# Patient Record
Sex: MALE | Race: WHITE | Employment: FULL TIME | URBAN - METROPOLITAN AREA
[De-identification: names, ages, dates, MRNs, and addresses within clinical notes are randomized per-mention and may not be internally consistent; named-entity substitution may affect disease eponyms.]

---

## 2017-07-07 ENCOUNTER — HOSPITAL ENCOUNTER (OUTPATIENT)
Age: 57
Setting detail: OBSERVATION
Discharge: HOME | End: 2017-07-08
Attending: EMERGENCY MEDICINE | Admitting: EMERGENCY MEDICINE
Payer: COMMERCIAL

## 2017-07-07 DIAGNOSIS — N20.0 RENAL STONE: Primary | ICD-10-CM

## 2017-07-07 LAB
-: NORMAL
ABSOLUTE EOS #: 0.2 K/UL (ref 0–0.4)
ABSOLUTE LYMPH #: 1 K/UL (ref 1–4.8)
ABSOLUTE MONO #: 1.2 K/UL (ref 0.1–1.2)
AMORPHOUS: NORMAL
ANION GAP SERPL CALCULATED.3IONS-SCNC: 12 MMOL/L (ref 9–17)
BACTERIA: NORMAL
BASOPHILS # BLD: 0 %
BASOPHILS ABSOLUTE: 0 K/UL (ref 0–0.2)
BILIRUBIN URINE: NEGATIVE
BUN BLDV-MCNC: 15 MG/DL (ref 6–20)
BUN/CREAT BLD: ABNORMAL (ref 9–20)
CALCIUM SERPL-MCNC: 9.4 MG/DL (ref 8.6–10.4)
CASTS UA: NORMAL /LPF (ref 0–8)
CHLORIDE BLD-SCNC: 98 MMOL/L (ref 98–107)
CO2: 25 MMOL/L (ref 20–31)
COLOR: YELLOW
COMMENT UA: ABNORMAL
CREAT SERPL-MCNC: 1.4 MG/DL (ref 0.7–1.2)
CRYSTALS, UA: NORMAL /HPF
DIFFERENTIAL TYPE: ABNORMAL
EOSINOPHILS RELATIVE PERCENT: 1 %
EPITHELIAL CELLS UA: NORMAL /HPF (ref 0–5)
GFR AFRICAN AMERICAN: >60 ML/MIN
GFR NON-AFRICAN AMERICAN: 52 ML/MIN
GFR SERPL CREATININE-BSD FRML MDRD: ABNORMAL ML/MIN/{1.73_M2}
GFR SERPL CREATININE-BSD FRML MDRD: ABNORMAL ML/MIN/{1.73_M2}
GLUCOSE BLD-MCNC: 110 MG/DL (ref 70–99)
GLUCOSE URINE: NEGATIVE
HCT VFR BLD CALC: 45.5 % (ref 41–53)
HEMOGLOBIN: 15.1 G/DL (ref 13.5–17.5)
KETONES, URINE: ABNORMAL
LEUKOCYTE ESTERASE, URINE: NEGATIVE
LYMPHOCYTES # BLD: 8 %
MCH RBC QN AUTO: 32.1 PG (ref 26–34)
MCHC RBC AUTO-ENTMCNC: 33.1 G/DL (ref 31–37)
MCV RBC AUTO: 96.8 FL (ref 80–100)
MONOCYTES # BLD: 10 %
MUCUS: NORMAL
NITRITE, URINE: NEGATIVE
OTHER OBSERVATIONS UA: NORMAL
PDW BLD-RTO: 14.1 % (ref 12.5–15.4)
PH UA: 5 (ref 5–8)
PLATELET # BLD: 188 K/UL (ref 140–450)
PLATELET ESTIMATE: ABNORMAL
PMV BLD AUTO: 8.6 FL (ref 6–12)
POTASSIUM SERPL-SCNC: 4.4 MMOL/L (ref 3.7–5.3)
PROTEIN UA: NEGATIVE
RBC # BLD: 4.7 M/UL (ref 4.5–5.9)
RBC # BLD: ABNORMAL 10*6/UL
RBC UA: NORMAL /HPF (ref 0–4)
RENAL EPITHELIAL, UA: NORMAL /HPF
SEG NEUTROPHILS: 81 %
SEGMENTED NEUTROPHILS ABSOLUTE COUNT: 9.9 K/UL (ref 1.8–7.7)
SODIUM BLD-SCNC: 135 MMOL/L (ref 135–144)
SPECIFIC GRAVITY UA: 1.02 (ref 1–1.03)
TRICHOMONAS: NORMAL
TURBIDITY: CLEAR
URINE HGB: ABNORMAL
UROBILINOGEN, URINE: NORMAL
WBC # BLD: 12.2 K/UL (ref 3.5–11)
WBC # BLD: ABNORMAL 10*3/UL
WBC UA: NORMAL /HPF (ref 0–5)
YEAST: NORMAL

## 2017-07-07 PROCEDURE — 81001 URINALYSIS AUTO W/SCOPE: CPT

## 2017-07-07 PROCEDURE — 96375 TX/PRO/DX INJ NEW DRUG ADDON: CPT

## 2017-07-07 PROCEDURE — 80048 BASIC METABOLIC PNL TOTAL CA: CPT

## 2017-07-07 PROCEDURE — 6360000002 HC RX W HCPCS: Performed by: EMERGENCY MEDICINE

## 2017-07-07 PROCEDURE — 2580000003 HC RX 258: Performed by: EMERGENCY MEDICINE

## 2017-07-07 PROCEDURE — 94640 AIRWAY INHALATION TREATMENT: CPT

## 2017-07-07 PROCEDURE — 85025 COMPLETE CBC W/AUTO DIFF WBC: CPT

## 2017-07-07 PROCEDURE — 96376 TX/PRO/DX INJ SAME DRUG ADON: CPT

## 2017-07-07 PROCEDURE — 96374 THER/PROPH/DIAG INJ IV PUSH: CPT

## 2017-07-07 PROCEDURE — 6360000002 HC RX W HCPCS: Performed by: STUDENT IN AN ORGANIZED HEALTH CARE EDUCATION/TRAINING PROGRAM

## 2017-07-07 PROCEDURE — G0378 HOSPITAL OBSERVATION PER HR: HCPCS

## 2017-07-07 PROCEDURE — 99284 EMERGENCY DEPT VISIT MOD MDM: CPT

## 2017-07-07 PROCEDURE — 87086 URINE CULTURE/COLONY COUNT: CPT

## 2017-07-07 PROCEDURE — 6370000000 HC RX 637 (ALT 250 FOR IP): Performed by: STUDENT IN AN ORGANIZED HEALTH CARE EDUCATION/TRAINING PROGRAM

## 2017-07-07 RX ORDER — 0.9 % SODIUM CHLORIDE 0.9 %
1000 INTRAVENOUS SOLUTION INTRAVENOUS ONCE
Status: COMPLETED | OUTPATIENT
Start: 2017-07-07 | End: 2017-07-07

## 2017-07-07 RX ORDER — ONDANSETRON 2 MG/ML
4 INJECTION INTRAMUSCULAR; INTRAVENOUS ONCE
Status: COMPLETED | OUTPATIENT
Start: 2017-07-07 | End: 2017-07-07

## 2017-07-07 RX ORDER — METOPROLOL SUCCINATE 100 MG/1
100 TABLET, EXTENDED RELEASE ORAL DAILY
Status: DISCONTINUED | OUTPATIENT
Start: 2017-07-07 | End: 2017-07-08 | Stop reason: HOSPADM

## 2017-07-07 RX ORDER — ONDANSETRON 2 MG/ML
4 INJECTION INTRAMUSCULAR; INTRAVENOUS EVERY 8 HOURS PRN
Status: DISCONTINUED | OUTPATIENT
Start: 2017-07-07 | End: 2017-07-08 | Stop reason: HOSPADM

## 2017-07-07 RX ORDER — PROMETHAZINE HYDROCHLORIDE 25 MG/ML
12.5 INJECTION, SOLUTION INTRAMUSCULAR; INTRAVENOUS EVERY 6 HOURS PRN
Status: DISCONTINUED | OUTPATIENT
Start: 2017-07-07 | End: 2017-07-08 | Stop reason: HOSPADM

## 2017-07-07 RX ORDER — ASPIRIN 81 MG/1
81 TABLET ORAL DAILY
Status: DISCONTINUED | OUTPATIENT
Start: 2017-07-07 | End: 2017-07-08 | Stop reason: HOSPADM

## 2017-07-07 RX ORDER — SODIUM CHLORIDE 9 MG/ML
INJECTION, SOLUTION INTRAVENOUS CONTINUOUS
Status: DISCONTINUED | OUTPATIENT
Start: 2017-07-07 | End: 2017-07-08 | Stop reason: HOSPADM

## 2017-07-07 RX ORDER — ASCORBIC ACID 1000 MG
1 TABLET ORAL DAILY
Status: DISCONTINUED | OUTPATIENT
Start: 2017-07-07 | End: 2017-07-07

## 2017-07-07 RX ORDER — WARFARIN SODIUM 1 MG/1
1 TABLET ORAL DAILY
Status: DISCONTINUED | OUTPATIENT
Start: 2017-07-07 | End: 2017-07-08 | Stop reason: HOSPADM

## 2017-07-07 RX ORDER — SODIUM CHLORIDE 0.9 % (FLUSH) 0.9 %
10 SYRINGE (ML) INJECTION PRN
Status: DISCONTINUED | OUTPATIENT
Start: 2017-07-07 | End: 2017-07-08 | Stop reason: HOSPADM

## 2017-07-07 RX ORDER — SODIUM CHLORIDE 0.9 % (FLUSH) 0.9 %
10 SYRINGE (ML) INJECTION EVERY 12 HOURS SCHEDULED
Status: DISCONTINUED | OUTPATIENT
Start: 2017-07-07 | End: 2017-07-08 | Stop reason: HOSPADM

## 2017-07-07 RX ORDER — MORPHINE SULFATE 4 MG/ML
4 INJECTION, SOLUTION INTRAMUSCULAR; INTRAVENOUS ONCE
Status: COMPLETED | OUTPATIENT
Start: 2017-07-07 | End: 2017-07-07

## 2017-07-07 RX ORDER — PANTOPRAZOLE SODIUM 40 MG/1
40 TABLET, DELAYED RELEASE ORAL
Status: DISCONTINUED | OUTPATIENT
Start: 2017-07-08 | End: 2017-07-08 | Stop reason: HOSPADM

## 2017-07-07 RX ORDER — SIMVASTATIN 40 MG
40 TABLET ORAL NIGHTLY
Status: DISCONTINUED | OUTPATIENT
Start: 2017-07-07 | End: 2017-07-08 | Stop reason: HOSPADM

## 2017-07-07 RX ADMIN — HYDROMORPHONE HYDROCHLORIDE 0.5 MG: 1 INJECTION, SOLUTION INTRAMUSCULAR; INTRAVENOUS; SUBCUTANEOUS at 17:58

## 2017-07-07 RX ADMIN — HYDROMORPHONE HYDROCHLORIDE 0.5 MG: 1 INJECTION, SOLUTION INTRAMUSCULAR; INTRAVENOUS; SUBCUTANEOUS at 22:03

## 2017-07-07 RX ADMIN — ONDANSETRON 4 MG: 2 INJECTION INTRAMUSCULAR; INTRAVENOUS at 15:05

## 2017-07-07 RX ADMIN — MORPHINE SULFATE 4 MG: 4 INJECTION, SOLUTION INTRAMUSCULAR; INTRAVENOUS at 15:05

## 2017-07-07 RX ADMIN — PROMETHAZINE HYDROCHLORIDE 12.5 MG: 25 INJECTION INTRAMUSCULAR; INTRAVENOUS at 17:58

## 2017-07-07 RX ADMIN — SODIUM CHLORIDE: 9 INJECTION, SOLUTION INTRAVENOUS at 18:05

## 2017-07-07 RX ADMIN — SODIUM CHLORIDE, PRESERVATIVE FREE 10 ML: 5 INJECTION INTRAVENOUS at 22:21

## 2017-07-07 RX ADMIN — SODIUM CHLORIDE 1000 ML: 9 INJECTION, SOLUTION INTRAVENOUS at 15:04

## 2017-07-07 RX ADMIN — BECLOMETHASONE DIPROPIONATE 2 PUFF: 80 AEROSOL, METERED RESPIRATORY (INHALATION) at 20:35

## 2017-07-07 RX ADMIN — HYDROMORPHONE HYDROCHLORIDE 1 MG: 1 INJECTION, SOLUTION INTRAMUSCULAR; INTRAVENOUS; SUBCUTANEOUS at 15:44

## 2017-07-07 ASSESSMENT — ENCOUNTER SYMPTOMS
SHORTNESS OF BREATH: 0
COLOR CHANGE: 0
CHEST TIGHTNESS: 0
DIARRHEA: 0
VOMITING: 1
ABDOMINAL PAIN: 0
NAUSEA: 1

## 2017-07-07 ASSESSMENT — PAIN DESCRIPTION - LOCATION: LOCATION: FLANK

## 2017-07-07 ASSESSMENT — PAIN DESCRIPTION - ORIENTATION: ORIENTATION: RIGHT;LEFT

## 2017-07-07 ASSESSMENT — PAIN SCALES - GENERAL
PAINLEVEL_OUTOF10: 3
PAINLEVEL_OUTOF10: 4
PAINLEVEL_OUTOF10: 3
PAINLEVEL_OUTOF10: 3
PAINLEVEL_OUTOF10: 10
PAINLEVEL_OUTOF10: 3

## 2017-07-07 ASSESSMENT — PAIN DESCRIPTION - DESCRIPTORS: DESCRIPTORS: DULL

## 2017-07-07 ASSESSMENT — PAIN DESCRIPTION - PAIN TYPE: TYPE: ACUTE PAIN

## 2017-07-08 ENCOUNTER — ANESTHESIA EVENT (OUTPATIENT)
Dept: OPERATING ROOM | Age: 57
End: 2017-07-08
Payer: COMMERCIAL

## 2017-07-08 ENCOUNTER — ANESTHESIA (OUTPATIENT)
Dept: OPERATING ROOM | Age: 57
End: 2017-07-08
Payer: COMMERCIAL

## 2017-07-08 ENCOUNTER — APPOINTMENT (OUTPATIENT)
Dept: GENERAL RADIOLOGY | Age: 57
End: 2017-07-08
Payer: COMMERCIAL

## 2017-07-08 VITALS
SYSTOLIC BLOOD PRESSURE: 101 MMHG | DIASTOLIC BLOOD PRESSURE: 63 MMHG | OXYGEN SATURATION: 94 % | RESPIRATION RATE: 14 BRPM | TEMPERATURE: 97.7 F

## 2017-07-08 VITALS
RESPIRATION RATE: 16 BRPM | HEIGHT: 71 IN | OXYGEN SATURATION: 93 % | WEIGHT: 195 LBS | TEMPERATURE: 97.5 F | DIASTOLIC BLOOD PRESSURE: 80 MMHG | BODY MASS INDEX: 27.3 KG/M2 | HEART RATE: 73 BPM | SYSTOLIC BLOOD PRESSURE: 127 MMHG

## 2017-07-08 PROBLEM — N20.0 KIDNEY STONE ON LEFT SIDE: Status: ACTIVE | Noted: 2017-07-08

## 2017-07-08 PROBLEM — N17.9 AKI (ACUTE KIDNEY INJURY) (HCC): Status: ACTIVE | Noted: 2017-07-08

## 2017-07-08 PROBLEM — N20.0 KIDNEY STONE ON RIGHT SIDE: Status: ACTIVE | Noted: 2017-07-08

## 2017-07-08 LAB
ANION GAP SERPL CALCULATED.3IONS-SCNC: 10 MMOL/L (ref 9–17)
BUN BLDV-MCNC: 12 MG/DL (ref 6–20)
BUN/CREAT BLD: NORMAL (ref 9–20)
CALCIUM SERPL-MCNC: 8.7 MG/DL (ref 8.6–10.4)
CHLORIDE BLD-SCNC: 105 MMOL/L (ref 98–107)
CO2: 22 MMOL/L (ref 20–31)
CREAT SERPL-MCNC: 1.07 MG/DL (ref 0.7–1.2)
CULTURE: NO GROWTH
CULTURE: NORMAL
GFR AFRICAN AMERICAN: >60 ML/MIN
GFR NON-AFRICAN AMERICAN: >60 ML/MIN
GFR SERPL CREATININE-BSD FRML MDRD: NORMAL ML/MIN/{1.73_M2}
GFR SERPL CREATININE-BSD FRML MDRD: NORMAL ML/MIN/{1.73_M2}
GLUCOSE BLD-MCNC: 94 MG/DL (ref 70–99)
HCT VFR BLD CALC: 41.5 % (ref 41–53)
HEMOGLOBIN: 13.7 G/DL (ref 13.5–17.5)
INR BLD: 1
Lab: NORMAL
MCH RBC QN AUTO: 32.3 PG (ref 26–34)
MCHC RBC AUTO-ENTMCNC: 33.1 G/DL (ref 31–37)
MCV RBC AUTO: 97.7 FL (ref 80–100)
PDW BLD-RTO: 14 % (ref 12.5–15.4)
PLATELET # BLD: 173 K/UL (ref 140–450)
PMV BLD AUTO: 8.3 FL (ref 6–12)
POTASSIUM SERPL-SCNC: 4.4 MMOL/L (ref 3.7–5.3)
PROTHROMBIN TIME: 10.9 SEC (ref 9.4–12.6)
RBC # BLD: 4.25 M/UL (ref 4.5–5.9)
SODIUM BLD-SCNC: 137 MMOL/L (ref 135–144)
SPECIMEN DESCRIPTION: NORMAL
STATUS: NORMAL
WBC # BLD: 10.1 K/UL (ref 3.5–11)

## 2017-07-08 PROCEDURE — 6360000002 HC RX W HCPCS: Performed by: NURSE ANESTHETIST, CERTIFIED REGISTERED

## 2017-07-08 PROCEDURE — C2617 STENT, NON-COR, TEM W/O DEL: HCPCS | Performed by: UROLOGY

## 2017-07-08 PROCEDURE — 96376 TX/PRO/DX INJ SAME DRUG ADON: CPT

## 2017-07-08 PROCEDURE — 2580000003 HC RX 258: Performed by: EMERGENCY MEDICINE

## 2017-07-08 PROCEDURE — 94640 AIRWAY INHALATION TREATMENT: CPT

## 2017-07-08 PROCEDURE — 7100000001 HC PACU RECOVERY - ADDTL 15 MIN: Performed by: UROLOGY

## 2017-07-08 PROCEDURE — 2500000003 HC RX 250 WO HCPCS: Performed by: NURSE ANESTHETIST, CERTIFIED REGISTERED

## 2017-07-08 PROCEDURE — 2720000010 HC SURG SUPPLY STERILE: Performed by: UROLOGY

## 2017-07-08 PROCEDURE — 3600000004 HC SURGERY LEVEL 4 BASE: Performed by: UROLOGY

## 2017-07-08 PROCEDURE — C1769 GUIDE WIRE: HCPCS | Performed by: UROLOGY

## 2017-07-08 PROCEDURE — 85610 PROTHROMBIN TIME: CPT

## 2017-07-08 PROCEDURE — 6370000000 HC RX 637 (ALT 250 FOR IP): Performed by: STUDENT IN AN ORGANIZED HEALTH CARE EDUCATION/TRAINING PROGRAM

## 2017-07-08 PROCEDURE — G0378 HOSPITAL OBSERVATION PER HR: HCPCS

## 2017-07-08 PROCEDURE — 85027 COMPLETE CBC AUTOMATED: CPT

## 2017-07-08 PROCEDURE — 80048 BASIC METABOLIC PNL TOTAL CA: CPT

## 2017-07-08 PROCEDURE — 74000 XR ABDOMEN LIMITED (KUB): CPT

## 2017-07-08 PROCEDURE — 36415 COLL VENOUS BLD VENIPUNCTURE: CPT

## 2017-07-08 PROCEDURE — 7100000000 HC PACU RECOVERY - FIRST 15 MIN: Performed by: UROLOGY

## 2017-07-08 PROCEDURE — 3600000014 HC SURGERY LEVEL 4 ADDTL 15MIN: Performed by: UROLOGY

## 2017-07-08 PROCEDURE — 3700000000 HC ANESTHESIA ATTENDED CARE: Performed by: UROLOGY

## 2017-07-08 PROCEDURE — 2580000003 HC RX 258: Performed by: NURSE ANESTHETIST, CERTIFIED REGISTERED

## 2017-07-08 PROCEDURE — C1758 CATHETER, URETERAL: HCPCS | Performed by: UROLOGY

## 2017-07-08 PROCEDURE — 6360000002 HC RX W HCPCS: Performed by: EMERGENCY MEDICINE

## 2017-07-08 PROCEDURE — 3700000001 HC ADD 15 MINUTES (ANESTHESIA): Performed by: UROLOGY

## 2017-07-08 PROCEDURE — 2580000003 HC RX 258: Performed by: UROLOGY

## 2017-07-08 DEVICE — URETERAL STENT
Type: IMPLANTABLE DEVICE | Status: FUNCTIONAL
Brand: POLARIS™ ULTRA

## 2017-07-08 RX ORDER — CEFAZOLIN SODIUM 1 G/3ML
INJECTION, POWDER, FOR SOLUTION INTRAMUSCULAR; INTRAVENOUS PRN
Status: DISCONTINUED | OUTPATIENT
Start: 2017-07-08 | End: 2017-07-08 | Stop reason: SDUPTHER

## 2017-07-08 RX ORDER — EPHEDRINE SULFATE 50 MG/ML
INJECTION, SOLUTION INTRAVENOUS PRN
Status: DISCONTINUED | OUTPATIENT
Start: 2017-07-08 | End: 2017-07-08 | Stop reason: SDUPTHER

## 2017-07-08 RX ORDER — DIPHENHYDRAMINE HYDROCHLORIDE 50 MG/ML
12.5 INJECTION INTRAMUSCULAR; INTRAVENOUS
Status: DISCONTINUED | OUTPATIENT
Start: 2017-07-08 | End: 2017-07-08 | Stop reason: HOSPADM

## 2017-07-08 RX ORDER — OXYCODONE HYDROCHLORIDE AND ACETAMINOPHEN 5; 325 MG/1; MG/1
1 TABLET ORAL PRN
Status: DISCONTINUED | OUTPATIENT
Start: 2017-07-08 | End: 2017-07-08 | Stop reason: HOSPADM

## 2017-07-08 RX ORDER — OXYCODONE HYDROCHLORIDE AND ACETAMINOPHEN 5; 325 MG/1; MG/1
2 TABLET ORAL PRN
Status: DISCONTINUED | OUTPATIENT
Start: 2017-07-08 | End: 2017-07-08 | Stop reason: HOSPADM

## 2017-07-08 RX ORDER — TRAMADOL HYDROCHLORIDE 50 MG/1
50 TABLET ORAL EVERY 6 HOURS PRN
Qty: 15 TABLET | Refills: 0 | Status: SHIPPED | OUTPATIENT
Start: 2017-07-08 | End: 2017-07-11

## 2017-07-08 RX ORDER — MIDAZOLAM HYDROCHLORIDE 1 MG/ML
INJECTION INTRAMUSCULAR; INTRAVENOUS PRN
Status: DISCONTINUED | OUTPATIENT
Start: 2017-07-08 | End: 2017-07-08 | Stop reason: SDUPTHER

## 2017-07-08 RX ORDER — FENTANYL CITRATE 50 UG/ML
25 INJECTION, SOLUTION INTRAMUSCULAR; INTRAVENOUS EVERY 5 MIN PRN
Status: DISCONTINUED | OUTPATIENT
Start: 2017-07-08 | End: 2017-07-08 | Stop reason: HOSPADM

## 2017-07-08 RX ORDER — MORPHINE SULFATE 2 MG/ML
2 INJECTION, SOLUTION INTRAMUSCULAR; INTRAVENOUS EVERY 5 MIN PRN
Status: DISCONTINUED | OUTPATIENT
Start: 2017-07-08 | End: 2017-07-08 | Stop reason: HOSPADM

## 2017-07-08 RX ORDER — FENTANYL CITRATE 50 UG/ML
INJECTION, SOLUTION INTRAMUSCULAR; INTRAVENOUS PRN
Status: DISCONTINUED | OUTPATIENT
Start: 2017-07-08 | End: 2017-07-08 | Stop reason: SDUPTHER

## 2017-07-08 RX ORDER — LIDOCAINE HYDROCHLORIDE 10 MG/ML
INJECTION, SOLUTION INFILTRATION; PERINEURAL PRN
Status: DISCONTINUED | OUTPATIENT
Start: 2017-07-08 | End: 2017-07-08 | Stop reason: SDUPTHER

## 2017-07-08 RX ORDER — DEXAMETHASONE SODIUM PHOSPHATE 4 MG/ML
INJECTION, SOLUTION INTRA-ARTICULAR; INTRALESIONAL; INTRAMUSCULAR; INTRAVENOUS; SOFT TISSUE PRN
Status: DISCONTINUED | OUTPATIENT
Start: 2017-07-08 | End: 2017-07-08 | Stop reason: SDUPTHER

## 2017-07-08 RX ORDER — SODIUM CHLORIDE, SODIUM LACTATE, POTASSIUM CHLORIDE, CALCIUM CHLORIDE 600; 310; 30; 20 MG/100ML; MG/100ML; MG/100ML; MG/100ML
INJECTION, SOLUTION INTRAVENOUS CONTINUOUS PRN
Status: DISCONTINUED | OUTPATIENT
Start: 2017-07-08 | End: 2017-07-08 | Stop reason: SDUPTHER

## 2017-07-08 RX ORDER — TAMSULOSIN HYDROCHLORIDE 0.4 MG/1
0.4 CAPSULE ORAL DAILY
Qty: 7 CAPSULE | Refills: 0 | Status: SHIPPED | OUTPATIENT
Start: 2017-07-08 | End: 2021-05-06

## 2017-07-08 RX ORDER — PROPOFOL 10 MG/ML
INJECTION, EMULSION INTRAVENOUS PRN
Status: DISCONTINUED | OUTPATIENT
Start: 2017-07-08 | End: 2017-07-08 | Stop reason: SDUPTHER

## 2017-07-08 RX ORDER — DOCUSATE SODIUM 100 MG/1
100 CAPSULE, LIQUID FILLED ORAL 2 TIMES DAILY
Qty: 60 CAPSULE | Refills: 0 | Status: SHIPPED | OUTPATIENT
Start: 2017-07-08

## 2017-07-08 RX ORDER — MAGNESIUM HYDROXIDE 1200 MG/15ML
LIQUID ORAL CONTINUOUS PRN
Status: DISCONTINUED | OUTPATIENT
Start: 2017-07-08 | End: 2017-07-08 | Stop reason: HOSPADM

## 2017-07-08 RX ORDER — ONDANSETRON 2 MG/ML
4 INJECTION INTRAMUSCULAR; INTRAVENOUS
Status: DISCONTINUED | OUTPATIENT
Start: 2017-07-08 | End: 2017-07-08 | Stop reason: HOSPADM

## 2017-07-08 RX ORDER — LABETALOL HYDROCHLORIDE 5 MG/ML
5 INJECTION, SOLUTION INTRAVENOUS EVERY 10 MIN PRN
Status: DISCONTINUED | OUTPATIENT
Start: 2017-07-08 | End: 2017-07-08 | Stop reason: HOSPADM

## 2017-07-08 RX ORDER — ONDANSETRON 2 MG/ML
INJECTION INTRAMUSCULAR; INTRAVENOUS PRN
Status: DISCONTINUED | OUTPATIENT
Start: 2017-07-08 | End: 2017-07-08 | Stop reason: SDUPTHER

## 2017-07-08 RX ADMIN — LIDOCAINE HYDROCHLORIDE 50 MG: 10 INJECTION, SOLUTION INFILTRATION; PERINEURAL at 08:35

## 2017-07-08 RX ADMIN — PANTOPRAZOLE SODIUM 40 MG: 40 TABLET, DELAYED RELEASE ORAL at 12:20

## 2017-07-08 RX ADMIN — CEFAZOLIN 2000 MG: 1 INJECTION, POWDER, FOR SOLUTION INTRAMUSCULAR; INTRAVENOUS at 08:58

## 2017-07-08 RX ADMIN — DEXAMETHASONE SODIUM PHOSPHATE 10 MG: 4 INJECTION, SOLUTION INTRAMUSCULAR; INTRAVENOUS at 08:46

## 2017-07-08 RX ADMIN — FENTANYL CITRATE 50 MCG: 50 INJECTION INTRAMUSCULAR; INTRAVENOUS at 09:15

## 2017-07-08 RX ADMIN — MIDAZOLAM HYDROCHLORIDE 2 MG: 1 INJECTION, SOLUTION INTRAMUSCULAR; INTRAVENOUS at 08:31

## 2017-07-08 RX ADMIN — FENTANYL CITRATE 50 MCG: 50 INJECTION INTRAMUSCULAR; INTRAVENOUS at 08:35

## 2017-07-08 RX ADMIN — HYDROMORPHONE HYDROCHLORIDE 0.5 MG: 1 INJECTION, SOLUTION INTRAMUSCULAR; INTRAVENOUS; SUBCUTANEOUS at 01:11

## 2017-07-08 RX ADMIN — HYDROMORPHONE HYDROCHLORIDE 0.25 MG: 1 INJECTION, SOLUTION INTRAMUSCULAR; INTRAVENOUS; SUBCUTANEOUS at 05:51

## 2017-07-08 RX ADMIN — SODIUM CHLORIDE, POTASSIUM CHLORIDE, SODIUM LACTATE AND CALCIUM CHLORIDE: 600; 310; 30; 20 INJECTION, SOLUTION INTRAVENOUS at 08:29

## 2017-07-08 RX ADMIN — SODIUM CHLORIDE: 9 INJECTION, SOLUTION INTRAVENOUS at 06:09

## 2017-07-08 RX ADMIN — METOPROLOL SUCCINATE 100 MG: 100 TABLET, FILM COATED, EXTENDED RELEASE ORAL at 12:19

## 2017-07-08 RX ADMIN — EPHEDRINE SULFATE 10 MG: 50 INJECTION, SOLUTION INTRAMUSCULAR; INTRAVENOUS; SUBCUTANEOUS at 08:55

## 2017-07-08 RX ADMIN — PROPOFOL 200 MG: 10 INJECTION, EMULSION INTRAVENOUS at 08:35

## 2017-07-08 RX ADMIN — BECLOMETHASONE DIPROPIONATE 2 PUFF: 80 AEROSOL, METERED RESPIRATORY (INHALATION) at 11:54

## 2017-07-08 RX ADMIN — ASPIRIN 81 MG: 81 TABLET, COATED ORAL at 12:19

## 2017-07-08 RX ADMIN — ONDANSETRON 4 MG: 2 INJECTION, SOLUTION INTRAMUSCULAR; INTRAVENOUS at 08:46

## 2017-07-08 RX ADMIN — WARFARIN SODIUM 1 MG: 1 TABLET ORAL at 12:19

## 2017-07-08 ASSESSMENT — PAIN - FUNCTIONAL ASSESSMENT
PAIN_FUNCTIONAL_ASSESSMENT: 0-10
PAIN_FUNCTIONAL_ASSESSMENT: 0-10

## 2017-07-08 ASSESSMENT — PAIN SCALES - GENERAL
PAINLEVEL_OUTOF10: 4
PAINLEVEL_OUTOF10: 0
PAINLEVEL_OUTOF10: 3
PAINLEVEL_OUTOF10: 3

## 2017-07-08 ASSESSMENT — COPD QUESTIONNAIRES: CAT_SEVERITY: MODERATE

## 2017-07-08 ASSESSMENT — PAIN DESCRIPTION - DESCRIPTORS: DESCRIPTORS: ACHING

## 2018-08-15 ENCOUNTER — HOSPITAL ENCOUNTER (OUTPATIENT)
Dept: OTHER | Age: 58
Discharge: HOME | End: 2018-08-15
Attending: UROLOGY | Admitting: UROLOGY

## 2018-08-15 DIAGNOSIS — N20.0 CALCULUS OF KIDNEY: ICD-10-CM

## 2019-08-20 ENCOUNTER — HOSPITAL ENCOUNTER (OUTPATIENT)
Dept: GENERAL RADIOLOGY | Age: 59
Discharge: HOME | End: 2019-08-20
Payer: COMMERCIAL

## 2019-08-20 ENCOUNTER — HOSPITAL ENCOUNTER (OUTPATIENT)
Age: 59
Discharge: HOME | End: 2019-08-20
Payer: COMMERCIAL

## 2019-08-20 DIAGNOSIS — N20.0 CALCULUS OF KIDNEY: ICD-10-CM

## 2019-08-20 PROCEDURE — 74018 RADEX ABDOMEN 1 VIEW: CPT

## 2020-11-04 ENCOUNTER — OFFICE VISIT (OUTPATIENT)
Dept: ORTHOPEDIC SURGERY | Age: 60
End: 2020-11-04
Payer: COMMERCIAL

## 2020-11-04 VITALS — WEIGHT: 195 LBS | HEIGHT: 71 IN | BODY MASS INDEX: 27.3 KG/M2

## 2020-11-04 PROBLEM — R22.43 FOOT MASS, BILATERAL: Status: ACTIVE | Noted: 2020-11-04

## 2020-11-04 PROCEDURE — 99243 OFF/OP CNSLTJ NEW/EST LOW 30: CPT | Performed by: ORTHOPAEDIC SURGERY

## 2020-11-04 NOTE — PROGRESS NOTES
Chief Complaint    New Patient (Left Foot - Cyst on distal 1st Met)      History of Present Illness:  Ellis Mccloud is a 62 y.o. male who I was asked to see in consultation by Dr. France Phillip for evaluation of bilateral foot masses. These have been present for quite some time but of late over the last year or so he noticed that the left has gotten significantly bigger it appears to be multiloculated. Not seem to get bigger or smaller with activity or over time but rather been stably growing. They are not associated with any pain. He denies any numbness or tingling with it. He is here mostly just because it is gotten substantially bigger and because of that his primary care physician recommended that he be seen by surgeon for consideration of possible surgical excision and further work-up. He has not tried anything for treatment thus far. Medical History:  Patient's medications, allergies, past medical, surgical, social and family histories were reviewed and updated as appropriate. Review of Systems:  Relevant review of systems reviewed and available in the patient's chart. They are negative or noncontributory except as per HPI. Vital Signs: There were no vitals filed for this visit. General: well-developed, well-nourished; adequately groomed  CV: RR  RESP: Respirations unlabored on RA  Skin: No rashes or ulcerations appreciated  Psych: appropriate mood and affect      Musculoskeletal:    Extremity: Bilateral lower extremities    Inspection: There is a large mass in the plantar medial aspect of his left foot and a smaller mass in the same location on the right side. This does appear firm but is mobile. The right mass is in the same position but is significantly smaller probably more like 1-1/2 cm and mobile. Both masses do seem like they may be coming from the FHL tendon. Palpation:   The left mass is probably about 3-1/2 to 4 cm from its plantar most dorsal most aspect.  The right mass is in the same position but is significantly smaller probably more like 1-1/2 cm and mobile. Both masses do seem like they may be coming from the FHL tendon. The masses do not seem to clearly arise from the plantar fascia. Range of Motion: Full ankle and first MTP motion    Stability: Ankle and first MTP stable    Strength: 5 out of 5 throughout including FHL and EHL    Special Tests:  Transillumination equivocal.     Gait: Normal    Pulse/perfusion: 2+ dorsalis pedis pulse, foot warm and well perfused    Neurological:    Sensation: Sensation intact to light touch throughout, no Tinel's over the masses    Reflexes: Deep tendon reflexes intact     Functional: the patient has good coordination and balance     Radiology:     X-rays obtained and reviewed in office:  Views AP lateral oblique weightbearing left foot  Impression a soft tissue mass is visible on the left foot but without evidence of calcifications are consistent anything consistent with a hemangioma from a radiographic standpoint. I do not see any osseous involvement from a soft tissue standpoint. Assessment : 64-year-old male with bilateral foot masses, left significantly larger than the right. Differential diagnosis is somewhat broad and includes ganglion cyst, giant cell tumor of tendon sheath, plantar fibromatosis, vascular malformation, less likely malignancy given the bilateral nature of the masses. Impression:  Encounter Diagnoses   Name Primary?     Left foot pain Yes    Foot mass, bilateral        Office Procedures:  Orders Placed This Encounter   Procedures    XR FOOT LEFT (MIN 3 VIEWS)     Standing Status:   Future     Number of Occurrences:   1     Standing Expiration Date:   11/4/2021    MRI FOOT LEFT W WO CONTRAST     Standing Status:   Future     Standing Expiration Date:   11/4/2021     Scheduling Instructions:      [facility] [address]      [phone]            AUTH #:      Juanito Cline AND DATE TBD      PLEASE CALL PATIENT ONCE APPROVED TO SCHEDULE       [facility]            Remember that it may take several business days to pre-cert your MRI through your insurance. Our office will contact you as soon as we have the approval. We will not give any test results over the phone. Dr. Rosalinda Mendoza will call you to review the results of your test. If you do not hear from our office within 48 hours of your test, please call Josefina Sharma at [phone]. Order Specific Question:   What is the area of interest?     Answer:   Forefoot    MRI FOOT RIGHT W WO CONTRAST     Standing Status:   Future     Standing Expiration Date:   11/4/2021     Scheduling Instructions:      [facility] [address]      [phone]            [place] [id number] #:      TIME AND DATE TBD      PLEASE CALL PATIENT ONCE APPROVED TO SCHEDULE       PUSH TO [facility]            Remember that it may take several business days to pre-cert your MRI through your insurance. Our office will contact you as soon as we have the approval. We will not give any test results over the phone. Dr. Rosalinda Mendoza will call you to review the results of your test. If you do not hear from our office within 48 hours of your test, please call Josefina Sharma at [phone]. Order Specific Question:   What is the area of interest?     Answer:   Forefoot       Treatment Plan:      I had a nice discussion with the patient today. I do think that even though this mass is not currently causing him problems it is certainly getting bigger and inevitably will. Many of the benign neoplasms are at high risk of recurrence in the context of a subtotal resection. Accordingly I do think that it makes sense to resect these when they are smaller rather than the bigger given that it is much more likely I can get the entire mass out. However before considering surgical intervention we needed diagnosis.   To that end I would recommend MRI with and without contrast of both feet for further anatomic understanding and diagnostic delineation of these neoplasms of both of his feet. I will see him back in my clinic to discuss the findings of the MRI and further surgical plans after the studies.

## 2021-03-18 ENCOUNTER — APPOINTMENT (RX ONLY)
Dept: URBAN - METROPOLITAN AREA CLINIC 170 | Facility: CLINIC | Age: 61
Setting detail: DERMATOLOGY
End: 2021-03-18

## 2021-03-18 DIAGNOSIS — L71.8 OTHER ROSACEA: ICD-10-CM

## 2021-03-18 PROCEDURE — ? PULSED-DYE LASER

## 2021-03-18 NOTE — HPI: RASH (ROSACEA)
How Severe Is Your Rosacea?: mild
Is This A New Presentation, Or A Follow-Up?: Rosacea
Additional History: Here today for laser.

## 2021-03-18 NOTE — PROCEDURE: PULSED-DYE LASER
Price (Use Numbers Only, No Special Characters Or $): 009 Price (Use Numbers Only, No Special Characters Or $): 842

## 2021-04-29 ENCOUNTER — TELEPHONE (OUTPATIENT)
Dept: PHARMACY | Age: 61
End: 2021-04-29

## 2021-04-29 NOTE — TELEPHONE ENCOUNTER
Received referral from Dr. Sean Christian to manage warfarin therapy. Patient is currently taking edoxaban [facility]), but needs switched to warfarin due to intolerance to edoxaban. Recommendation for switching from edoxaban to warfarin is as follows: For patients taking edoxaban 60 mg once daily, reduce the dose to 30 mg once daily and begin warfarin concomitantly. For patients taking edoxaban 30 mg once daily, reduce the dose to 15 mg once daily and begin warfarin concomitantly. Measure INR at least weekly and just prior to the daily dose of edoxaban to minimize influence of edoxaban on INR measurements. Discontinue edoxaban once a stable INR ?2 is achieved; continue warfarin. Will call patient tomorrow to discuss switch to warfarin and schedule first INR check.        [place], PharmD, BCPS  [facility]   [facility] Ph: [phone]  [facility] Ph: [phone]  4/29/2021 5:44 PM

## 2021-04-30 NOTE — TELEPHONE ENCOUNTER
Called patient. He is out of town this weekend, but can  warfarin and start on Monday, 5/3. He is taking edoxaban 60 mg daily. Patient was instructed to decrease edoxaban dose to 30 mg daily and start warfarin 5 mg daily on 5/3. Rx for warfarin sent to [address] First INR check scheduled for 5/6 @ 1500. Patient was provided with clinic address. Prescription called to pharmacy:  Pharmacy:  [facility] Phone:  [phone]   Warfarin strength:  5 mg Number of tablets:  30   Sig:  Take 1 tab PO daily or as directed by clinic Refills:  3   Physician:  Dr. Laurence Sales.  Vira Rhoades, PharmD, [facility]  [facility]  Ph: [phone]  4/30/2021 1:03 PM         For Pharmacy Admin Tracking Only     Intervention Detail: Dose Adjustment: 1: reason: Therapy Optimization and New Rx: 1, reason: KEDAR   Total # of Interventions Recommended: 2   Total # of Interventions Accepted: 2   Time Spent (min): 20

## 2021-05-06 ENCOUNTER — ANTI-COAG VISIT (OUTPATIENT)
Dept: PHARMACY | Age: 61
End: 2021-05-06
Payer: COMMERCIAL

## 2021-05-06 PROBLEM — I82.409 DVT OF LEG (DEEP VENOUS THROMBOSIS) (HCC): Status: ACTIVE | Noted: 2021-05-06

## 2021-05-06 LAB — INTERNATIONAL NORMALIZATION RATIO, POC: 1.2

## 2021-05-06 PROCEDURE — 85610 PROTHROMBIN TIME: CPT

## 2021-05-06 PROCEDURE — 99213 OFFICE O/P EST LOW 20 MIN: CPT

## 2021-05-06 NOTE — PROGRESS NOTES
ANTICOAGULATION SERVICE    Lord Carpenter is a 62 y.o. male with PMHx significant for history of DVT (Feb 2011, Oct 2011, Dec 2012, pt reports Feb 2021 - surgically provoked), borderline protein S deficiency who presents to clinic 5/6/2021 for anticoagulation monitoring and adjustment. Patient has tried Xarelto, Eliquis, Pradaxa, and Savaysa in the past but stopped due to GI distress. Anticoagulation Indication(s):  DVT    Referring Physician:   Dr. Nayan Gibson  Goal INR Range:  2.0-3.0  Duration of Anticoagulation Therapy:  Indefinite   Time of day dose taken: AM  Product patient has at home: warfarin 5 mg (peach color)    Pertinent labs:  Lab Results   Component Value Date    INR 1.0 07/08/2017         INR Summary                           Warfarin regimen (mg)  Date INR   A/P    Sun Mon Tue Wed Thu Fri Sat Mg/wk  5/6 1.2 Below goal, continue  5 5 5 5 5 5 5 35    Last CBC:  Lab Results   Component Value Date    RBC 4.25 (L) 07/08/2017    HGB 13.7 07/08/2017    HCT 41.5 07/08/2017    MCV 97.7 07/08/2017    MCH 32.3 07/08/2017    MPV 8.3 07/08/2017    RDW 14.0 07/08/2017     07/08/2017       Patient History:  Recent hospitalizations/HC visits Feb 2021 - surgically provoked DVT, had been off anticoagulation at that point   Recent medication changes Bridging with Savaysa currently   Medications taken regularly that may interact with warfarin or alter INR CoQ10, Fish Oil   Warfarin dose taken as prescribed Uses pillbox - reports some difficulty remembering PM meds, takes warfarin in AM   Signs/symptoms of bleeding None reported   Vitamin K intake Normally has ~3 servings of green, leafy vegetables per week   Recent vomiting/diarrhea/fever, changes in weight or activity level None reported   Tobacco or alcohol use Patient does not smoke  Patient reports having 2 drinks per week   Upcoming surgeries or procedures None reported     Assessment/Plan:  Patient's INR was subtheraputic (1.2) today.  Lord Carpenter was instructed to continue warfarin dose: 5 mg daily. Patient was reminded to maintain consistent vitamin K intake and call with any bleeding, medication changes, or fever/vomiting/diarrhea. Patient reports he was on warfarin 1 mg daily in the past however did not get his INR monitored during that time so unsure if this was a therapeutic dose. As it was the first visit to [place] [address] [facility] for [facility], the following was also reviewed with the patient in detail:  · Reason for and intended duration of therapy  · INR goal and frequency of INR monitoring. · Medication Interactions: Call clinic if new any new medications are started--especially antibiotics. Avoid NSAIDs for pain. Use Tylenol instead. · Food-Drug Interactions: Foods high in vitamin K can change the effects of warfarin (decrease INR)--Stressed consistency with these foods. Reviewed a list of high vitamin K foods- mostly leafy green vegetables. · Side effects: May bruise easier and small cuts may take longer to clot than usual.  Seek medical attention for any cuts that won't stop bleeding or falls involving head injury. Any blood in the urine or stool should be reported immediately. · Effect of alcohol and tobacco on INR  · Call the clinic with planned surgeries or procedures. · An information pamphlet \"A Guide to Your Warfarin Therapy\" was provided to the patient. Patient understands dosing directions and information discussed. Dosing schedule and follow up appointment given to patient. Progress note routed to referring physician's office. Patient acknowledges working in consult agreement with pharmacist as referred by his/her physician. Next Clinic Appointment:  5/10    Please call [place] [address] [facility] at [phone] with any questions. Please call The [address] [place] [address] Avenue at [phone] with any questions. Thanks!   Della Hill, PharmD  [address], Pharm D, BCPS  5/6/2021 11:33 AM

## 2021-05-10 ENCOUNTER — ANTI-COAG VISIT (OUTPATIENT)
Dept: PHARMACY | Age: 61
End: 2021-05-10
Payer: COMMERCIAL

## 2021-05-10 LAB — INTERNATIONAL NORMALIZATION RATIO, POC: 1.7

## 2021-05-10 PROCEDURE — 85610 PROTHROMBIN TIME: CPT | Performed by: PHARMACIST

## 2021-05-10 PROCEDURE — 99212 OFFICE O/P EST SF 10 MIN: CPT | Performed by: PHARMACIST

## 2021-05-10 NOTE — PROGRESS NOTES
ANTICOAGULATION SERVICE    Agapito Worley is a 62 y.o. male with PMHx significant for history of DVT (Feb 2011, Oct 2011, Dec 2012, pt reports Feb 2021 - surgically provoked), borderline protein S deficiency who presents to clinic 5/10/2021 for anticoagulation monitoring and adjustment. Patient has tried Xarelto, Eliquis, Pradaxa, and Savaysa in the past but stopped due to GI distress.      Anticoagulation Indication(s):  DVT    Referring Physician:   Dr. Shanthi Rosales  Goal INR Range:  2.0-3.0  Duration of Anticoagulation Therapy:  Indefinite   Time of day dose taken: AM  Product patient has at home: warfarin 5 mg (peach color)    Pertinent labs:  Lab Results   Component Value Date    INR 1.2 05/06/2021    INR 1.0 07/08/2017         INR Summary                           Warfarin regimen (mg)  Date INR   A/P    Sun Mon Tue Wed Thu Fri Sat Mg/wk  5/10 1.7 Below goal, increase  5 7.5 5 5 5 7.5 5 40  5/6 1.2 Below goal, continue  5 5 5 5 5 5 5 35    Last CBC:  Lab Results   Component Value Date    RBC 4.25 (L) 07/08/2017    HGB 13.7 07/08/2017    HCT 41.5 07/08/2017    MCV 97.7 07/08/2017    MCH 32.3 07/08/2017    MPV 8.3 07/08/2017    RDW 14.0 07/08/2017     07/08/2017       Patient History:  Recent hospitalizations/HC visits Feb 2021 - surgically provoked DVT, had been off anticoagulation at that point   Recent medication changes Bridging with Savaysa currently   Medications taken regularly that may interact with warfarin or alter INR CoQ10, Fish Oil   Warfarin dose taken as prescribed Uses pillbox - reports some difficulty remembering PM meds, takes warfarin in AM   Signs/symptoms of bleeding None reported   Vitamin K intake Normally has ~3 servings of green, leafy vegetables per week   Recent vomiting/diarrhea/fever, changes in weight or activity level None reported   Tobacco or alcohol use Patient does not smoke  Patient reports having 2 drinks per week   Upcoming surgeries or procedures None reported Assessment/Plan:  Patient's INR was subtheraputic (1.7) today after being on warfarin for 1 week. Patient denies any missed/extra doses, diet or medication changes, illness, bleeding, or bruising since last appointment. He reports having 1-2 tablets of Savaysa remaining. He does have some calf pain in his left leg where his DVT from February is, which is worse after exercise (he states it may be a pulled muscle). There is no redness, warmth, or swelling at the site. Advised patient to schedule appointment with provider if this worsens, as he may need dopplers to determine potential worsening of blood clot. Note: Patient reports he was on warfarin 1 mg daily in the past, however only got his INR monitored ~yearly during that time. Per chart review, all INRs were 0.9-1.1. As INR slightly subtherapeutic today, patient was instructed to increase warfarin to 7.5mg Mondays and Fridays and 5 mg all other days. He will finish his course of Savaysa (he has 2-4 days remaining). Anticipate INR will be therapeutic in the next 2-3 days with taking increased dose today. Will repeat INR in 4 days. Patient was reminded to maintain consistent vitamin K intake and call with any bleeding, medication changes, or fever/vomiting/diarrhea. Patient understands dosing directions and information discussed. Dosing schedule and follow up appointment given to patient. Progress note routed to referring physician's office. Patient acknowledges working in consult agreement with pharmacist as referred by his/her physician. Next Clinic Appointment:  5/14    Please call [facility] at [phone] with any questions. Please call The [address] [place] [address] at [phone] with any questions. Thanks!   Mando Donaldson, PharmD, BCPS  [facility]  [place]: [address] [place] [address] Street: [phone]  5/10/2021 9:36 AM    For Pharmacy Admin Tracking Only     Intervention Detail: Dose Adjustment: 1: reason: Therapy Optimization   Total # of Interventions Recommended: 1   Total # of Interventions Accepted: 1   Time Spent (min): 15

## 2021-05-14 ENCOUNTER — ANTI-COAG VISIT (OUTPATIENT)
Dept: PHARMACY | Age: 61
End: 2021-05-14
Payer: COMMERCIAL

## 2021-05-14 LAB — INTERNATIONAL NORMALIZATION RATIO, POC: 1.6

## 2021-05-14 PROCEDURE — 99212 OFFICE O/P EST SF 10 MIN: CPT | Performed by: PHARMACIST

## 2021-05-14 PROCEDURE — 85610 PROTHROMBIN TIME: CPT | Performed by: PHARMACIST

## 2021-05-14 NOTE — PROGRESS NOTES
ANTICOAGULATION SERVICE    Joe Mckinnon is a 62 y.o. male with PMHx significant for history of DVT (Feb 2011, Oct 2011, Dec 2012, pt reports Feb 2021 - surgically provoked), borderline protein S deficiency who presents to clinic 5/14/2021 for anticoagulation monitoring and adjustment. Patient has tried Xarelto, Eliquis, Pradaxa, and Savaysa in the past but stopped due to GI distress.      Anticoagulation Indication(s):  DVT    Referring Physician:   Dr. Alan Wright  Goal INR Range:  2.0-3.0  Duration of Anticoagulation Therapy:  Indefinite   Time of day dose taken: AM  Product patient has at home: warfarin 5 mg (peach color)    Pertinent labs:  Lab Results   Component Value Date    INR 1.7 05/10/2021    INR 1.2 05/06/2021    INR 1.0 07/08/2017         INR Summary                           Warfarin regimen (mg)  Date INR   A/P    Sun Mon Tue Wed Thu Fri Sat Mg/wk  5/14 1.6 Below goal, bolus+increase 7.5 7.5 5 7.5 5 10/7.5 7.5 47.5   5/10 1.7 Below goal, increase  5 7.5 5 5 5 7.5 5 40   5/6 1.2 Below goal, continue  5 5 5 5 5 5 5 35    Last CBC:  Lab Results   Component Value Date    RBC 4.25 (L) 07/08/2017    HGB 13.7 07/08/2017    HCT 41.5 07/08/2017    MCV 97.7 07/08/2017    MCH 32.3 07/08/2017    MPV 8.3 07/08/2017    RDW 14.0 07/08/2017     07/08/2017       Patient History:  Recent hospitalizations/HC visits Feb 2021 - surgically provoked DVT, had been off anticoagulation at that point   Recent medication changes Bridging with Savaysa currently   Medications taken regularly that may interact with warfarin or alter INR CoQ10, Fish Oil   Warfarin dose taken as prescribed Uses pillbox - reports some difficulty remembering PM meds, takes warfarin in AM   Signs/symptoms of bleeding None reported   Vitamin K intake Normally has ~3 servings of green, leafy vegetables per week   Recent vomiting/diarrhea/fever, changes in weight or activity level None reported   Tobacco or alcohol use Patient does not smoke Patient reports having 2 drinks per week   Upcoming surgeries or procedures None reported     Assessment/Plan:  Patient's INR was subtheraputic (1.6) today and decreased from Monday despite bolus dose. Patient denies any missed/extra doses, diet or medication changes, illness, bleeding, or bruising since last appointment. He reports still having 1-2 tablets of Savaysa remaining. He reported calf pain earlier this week which has since subsided. Advised patient to schedule appointment with provider if this returns or worsens, as he may need dopplers to determine potential worsening of blood clot. Note: Patient reports he was on warfarin 1 mg daily in the past, however only got his INR monitored ~yearly during that time. Per chart review, all INRs were 0.9-1.1. As INR trended down, patient was instructed to bolus with warfarin 10mg tonight then increase warfarin to 7.5mg daily except 5 mg on Tuesdays and Thursdays (18% increase). He will finish his course of Savaysa (he has 2-4 days remaining). Will repeat INR in 5 days. He notified me that he may be going fishing in [facility] for 10 days (may leave this weekend). Will likely need to coordinate INR check while he is there, otherwise patient is agreeable to driving back here for INR check if absolutely necessary. Patient was reminded to maintain consistent vitamin K intake and call with any bleeding, medication changes, or fever/vomiting/diarrhea. Patient understands dosing directions and information discussed. Dosing schedule and follow up appointment given to patient. Progress note routed to referring physician's office. Patient acknowledges working in consult agreement with pharmacist as referred by his/her physician. Next Clinic Appointment:  5/19    Please call [facility] at [phone] with any questions. Please call The [address] [place] [address] Avenue at [phone] with any questions. Thanks!   Freddy Day, PharmD, [facility]  [place]: [address]: [phone]  5/14/2021 9:34 AM    For Pharmacy Admin Tracking Only     Intervention Detail: Dose Adjustment: 1: reason: Therapy Optimization   Total # of Interventions Recommended: 1   Total # of Interventions Accepted: 1   Time Spent (min): 15

## 2021-05-19 ENCOUNTER — ANTI-COAG VISIT (OUTPATIENT)
Dept: PHARMACY | Age: 61
End: 2021-05-19
Payer: COMMERCIAL

## 2021-05-19 DIAGNOSIS — I82.4Z9 DEEP VEIN THROMBOSIS (DVT) OF DISTAL VEIN OF LOWER EXTREMITY, UNSPECIFIED CHRONICITY, UNSPECIFIED LATERALITY (HCC): Primary | ICD-10-CM

## 2021-05-19 LAB — INTERNATIONAL NORMALIZATION RATIO, POC: 2.6

## 2021-05-19 PROCEDURE — 85610 PROTHROMBIN TIME: CPT

## 2021-05-19 PROCEDURE — 99211 OFF/OP EST MAY X REQ PHY/QHP: CPT

## 2021-05-19 NOTE — PROGRESS NOTES
ANTICOAGULATION SERVICE    Lorie Ang is a 62 y.o. male with PMHx significant for history of DVT (Feb 2011, Oct 2011, Dec 2012, pt reports Feb 2021 - surgically provoked), borderline protein S deficiency who presents to clinic 5/19/2021 for anticoagulation monitoring and adjustment. Patient has tried Xarelto, Eliquis, Pradaxa, and Savaysa in the past but stopped due to GI distress.      Anticoagulation Indication(s):  DVT    Referring Physician:   Dr. Tamara Lopez  Goal INR Range:  2.0-3.0  Duration of Anticoagulation Therapy:  Indefinite   Time of day dose taken: AM  Product patient has at home: warfarin 5 mg (peach color)    Pertinent labs:  Lab Results   Component Value Date    INR 1.6 05/14/2021    INR 1.7 05/10/2021    INR 1.2 05/06/2021    INR 1.0 07/08/2017       INR Summary                           Warfarin regimen (mg)  Date INR   A/P    Sun Mon Tue Wed Thu Fri Sat Mg/wk  5/19 2.6 At goal, decrease  7.5 7.5 5 7.5 5 7.5 5 45  5/14 1.6 Below goal, bolus+increase 7.5 7.5 5 7.5 5 10/7.5 7.5 47.5   5/10 1.7 Below goal, increase  5 7.5 5 5 5 7.5 5 40   5/6 1.2 Below goal, continue  5 5 5 5 5 5 5 35    Last CBC:  Lab Results   Component Value Date    RBC 4.25 (L) 07/08/2017    HGB 13.7 07/08/2017    HCT 41.5 07/08/2017    MCV 97.7 07/08/2017    MCH 32.3 07/08/2017    MPV 8.3 07/08/2017    RDW 14.0 07/08/2017     07/08/2017       Patient History:  Recent hospitalizations/HC visits Feb 2021 - surgically provoked DVT, had been off anticoagulation at that point   Recent medication changes Bridging with Savaysa currently   Medications taken regularly that may interact with warfarin or alter INR CoQ10, Fish Oil   Warfarin dose taken as prescribed Uses pillbox - reports some difficulty remembering PM meds, takes warfarin in AM   Signs/symptoms of bleeding None reported   Vitamin K intake Normally has ~3 servings of green, leafy vegetables per week   Recent vomiting/diarrhea/fever, changes in weight or

## 2021-05-24 ENCOUNTER — ANTI-COAG VISIT (OUTPATIENT)
Dept: PHARMACY | Age: 61
End: 2021-05-24
Payer: COMMERCIAL

## 2021-05-24 DIAGNOSIS — I82.4Y9 DEEP VEIN THROMBOSIS (DVT) OF PROXIMAL LOWER EXTREMITY, UNSPECIFIED CHRONICITY, UNSPECIFIED LATERALITY (HCC): Primary | ICD-10-CM

## 2021-05-24 LAB — INR BLD: 2.7

## 2021-05-24 PROCEDURE — 99211 OFF/OP EST MAY X REQ PHY/QHP: CPT

## 2021-05-24 PROCEDURE — 85610 PROTHROMBIN TIME: CPT

## 2021-05-24 NOTE — PROGRESS NOTES
ANTICOAGULATION SERVICE    Gabrielle Ferrer is a 62 y.o. male with PMHx significant for history of DVT (Feb 2011, Oct 2011, Dec 2012, Jan 2021 - surgically provoked), borderline protein S deficiency who presents to clinic 5/24/2021 for anticoagulation monitoring and adjustment. Patient has tried Xarelto, Eliquis, Pradaxa, and Savaysa in the past, but stopped due to GI distress.      Anticoagulation Indication(s):  DVT (acute, recurrent)  Referring Physician:  Dr. Hernadez Case  Goal INR Range:  2-3  Duration of Anticoagulation Therapy:  Indefinite   Time of day dose taken:  AM  Product patient has at home:  warfarin 5 mg (peach)      INR Summary                           Warfarin regimen (mg)  Date INR   A/P    Sun Mon Tue Wed Thu Fri Sat Mg/wk  5/24 2.7 At goal, no change  7.5 7.5 5 7.5 5 7.5 5 45  5/19 2.6 At goal, decrease  7.5 7.5 5 7.5 5 7.5 5 45  5/14 1.6 Below goal, bolus+increase 7.5 7.5 5 7.5 5 10/7.5 7.5 47.5   5/10 1.7 Below goal, increase  5 7.5 5 5 5 7.5 5 40   5/6 1.2 Below goal, continue  5 5 5 5 5 5 5 35    Last CBC:  Lab Results   Component Value Date    RBC 4.25 (L) 07/08/2017    HGB 13.7 07/08/2017    HCT 41.5 07/08/2017    MCV 97.7 07/08/2017    MCH 32.3 07/08/2017    MPV 8.3 07/08/2017    RDW 14.0 07/08/2017     07/08/2017       Patient History:  Recent hospitalizations/HC visits 1/28 [facility] ED for acute LLE DVT: diagnosed ~1 week after left foot surgery; was off anticoagulation for ~1-2 years at the time; started on Xarelto   Recent medication changes Off Savaysa (bridging complete)   Medications taken regularly that may interact with warfarin or alter INR CoQ10, Fish Oil   Warfarin dose taken as prescribed Uses pillbox - reports some difficulty remembering PM meds, but takes warfarin in AM   Signs/symptoms of bleeding None reported   Vitamin K intake Normally has ~3 servings of green, leafy vegetables per week   Recent vomiting/diarrhea/fever, changes in weight or activity level None

## 2021-06-07 ENCOUNTER — ANTI-COAG VISIT (OUTPATIENT)
Dept: PHARMACY | Age: 61
End: 2021-06-07
Payer: COMMERCIAL

## 2021-06-07 LAB — INTERNATIONAL NORMALIZATION RATIO, POC: 3

## 2021-06-07 PROCEDURE — 99211 OFF/OP EST MAY X REQ PHY/QHP: CPT

## 2021-06-07 PROCEDURE — 85610 PROTHROMBIN TIME: CPT

## 2021-06-07 NOTE — PROGRESS NOTES
ANTICOAGULATION SERVICE    Olive Juares is a 62 y.o. male with PMHx significant for history of DVT (Feb 2011, Oct 2011, Dec 2012, Jan 2021 - surgically provoked), borderline protein S deficiency who presents to clinic 6/7/2021 for anticoagulation monitoring and adjustment. Patient has tried Xarelto, Eliquis, Pradaxa, and Savaysa in the past, but stopped due to GI distress.      Anticoagulation Indication(s):  DVT (acute, recurrent)  Referring Physician:  Dr. Clarisse Romano  Goal INR Range:  2-3  Duration of Anticoagulation Therapy:  Indefinite   Time of day dose taken:  AM  Product patient has at home:  warfarin 5 mg (peach)      INR Summary                           Warfarin regimen (mg)  Date INR   A/P    Sun Mon Tue Wed Thu Fri Sat Mg/wk  6/7 3.0 At goal, no change  7.5 7.5 5 7.5 5 7.5 5 45  5/24 2.7 At goal, no change  7.5 7.5 5 7.5 5 7.5 5 45  5/19 2.6 At goal, decrease  7.5 7.5 5 7.5 5 7.5 5 45  5/14 1.6 Below goal, bolus+increase 7.5 7.5 5 7.5 5 10/7.5 7.5 47.5   5/10 1.7 Below goal, increase  5 7.5 5 5 5 7.5 5 40   5/6 1.2 Below goal, continue  5 5 5 5 5 5 5 35    Last CBC:  Lab Results   Component Value Date    RBC 4.25 (L) 07/08/2017    HGB 13.7 07/08/2017    HCT 41.5 07/08/2017    MCV 97.7 07/08/2017    MCH 32.3 07/08/2017    MPV 8.3 07/08/2017    RDW 14.0 07/08/2017     07/08/2017       Patient History:  Recent hospitalizations/HC visits 1/28 [facility] ED for acute LLE DVT: diagnosed ~1 week after left foot surgery; was off anticoagulation for ~1-2 years at the time; started on Xarelto   Recent medication changes Off Savaysa (bridging complete)   Medications taken regularly that may interact with warfarin or alter INR CoQ10, Fish Oil   Warfarin dose taken as prescribed Uses pillbox - reports some difficulty remembering PM meds, but takes warfarin in AM   Signs/symptoms of bleeding None reported   Vitamin K intake Normally has ~3 servings of green, leafy vegetables per week   Recent

## 2021-06-16 ENCOUNTER — TELEPHONE (OUTPATIENT)
Dept: PHARMACY | Age: 61
End: 2021-06-16

## 2021-06-16 RX ORDER — WARFARIN SODIUM 5 MG/1
TABLET ORAL
Qty: 45 TABLET | Refills: 3 | OUTPATIENT
Start: 2021-06-16

## 2021-06-16 RX ORDER — WARFARIN SODIUM 5 MG/1
5 TABLET ORAL DAILY
Qty: 30 TABLET | Refills: 3 | Status: CANCELLED | OUTPATIENT
Start: 2021-06-16

## 2021-06-16 NOTE — TELEPHONE ENCOUNTER
Patient called to request refill be sent to [facility].     Juan Manuel Arias PharmD  PGY1 Pharmacy Resident  6/16/2021 5:03 PM

## 2021-06-16 NOTE — TELEPHONE ENCOUNTER
RX called into pharmacy. Unable to send electronically, as provider not in system.     John Frazier, PharmD, [facility]  Medication Management Clinic   [facility] [address] [address] Ph: [phone]  Talisha Moreno Ph: [phone]  6/16/2021 5:29 PM

## 2021-06-28 ENCOUNTER — ANTI-COAG VISIT (OUTPATIENT)
Dept: PHARMACY | Age: 61
End: 2021-06-28
Payer: COMMERCIAL

## 2021-06-28 DIAGNOSIS — I82.4Y9 DEEP VEIN THROMBOSIS (DVT) OF PROXIMAL LOWER EXTREMITY, UNSPECIFIED CHRONICITY, UNSPECIFIED LATERALITY (HCC): Primary | ICD-10-CM

## 2021-06-28 LAB — INTERNATIONAL NORMALIZATION RATIO, POC: 2.6

## 2021-06-28 PROCEDURE — 85610 PROTHROMBIN TIME: CPT

## 2021-06-28 PROCEDURE — 99211 OFF/OP EST MAY X REQ PHY/QHP: CPT

## 2021-06-28 NOTE — PROGRESS NOTES
ANTICOAGULATION SERVICE    Darline Worthy is a 62 y.o. male with PMHx significant for history of DVT (Feb 2011, Oct 2011, Dec 2012, Jan 2021 - surgically provoked), borderline protein S deficiency who presents to clinic 6/28/2021 for anticoagulation monitoring and adjustment. Patient has tried Xarelto, Eliquis, Pradaxa, and Savaysa in the past, but stopped due to GI distress.      Anticoagulation Indication(s):  DVT (acute, recurrent)  Referring Physician:  Dr. Natalie Livingston  Goal INR Range:  2-3  Duration of Anticoagulation Therapy:  Indefinite   Time of day dose taken:  AM  Product patient has at home:  warfarin 5 mg (peach)      INR Summary                           Warfarin regimen (mg)  Date INR   A/P    Sun Mon Tue Wed Thu Fri Sat Mg/wk  6/28 2.6 At goal, no change  7.5 7.5 5 7.5 5 7.5 5 45  6/7 3.0 At goal, no change  7.5 7.5 5 7.5 5 7.5 5 45  5/24 2.7 At goal, no change  7.5 7.5 5 7.5 5 7.5 5 45  5/19 2.6 At goal, decrease  7.5 7.5 5 7.5 5 7.5 5 45  5/14 1.6 Below goal, bolus+increase 7.5 7.5 5 7.5 5 10/7.5 7.5 47.5   5/10 1.7 Below goal, increase  5 7.5 5 5 5 7.5 5 40   5/6 1.2 Below goal, continue  5 5 5 5 5 5 5 35    Last CBC:  Lab Results   Component Value Date    RBC 4.25 (L) 07/08/2017    HGB 13.7 07/08/2017    HCT 41.5 07/08/2017    MCV 97.7 07/08/2017    MCH 32.3 07/08/2017    MPV 8.3 07/08/2017    RDW 14.0 07/08/2017     07/08/2017       Patient History:  Recent hospitalizations/HC visits 1/28 [facility] ED for acute LLE DVT: diagnosed ~1 week after left foot surgery; was off anticoagulation for ~1-2 years at the time; started on Xarelto   Recent medication changes Off Savaysa (bridging complete)   Medications taken regularly that may interact with warfarin or alter INR CoQ10, Fish Oil   Warfarin dose taken as prescribed Uses pillbox - reports some difficulty remembering PM meds, but takes warfarin in AM   Signs/symptoms of bleeding None reported   Vitamin K intake Normally has ~3 servings of green, leafy vegetables per week   Recent vomiting/diarrhea/fever, changes in weight or activity level None reported   Tobacco or alcohol use Patient denies smoking  Patient reports having 2 drinks per week   Upcoming surgeries or procedures None reported     Assessment/Plan:  Patient's INR was therapeutic today (2.6). Patient denies any missed/extra doses, diet changes, medication changes, illness, or bleeding since last visit. Patient was instructed to continue warfarin 7.5 mg daily, except 5 mg on TuThSa. Repeat INR in 4 weeks. Patient was reminded to maintain consistent vitamin K intake and call with any bleeding, medication changes, or fever/vomiting/diarrhea. Note: patient reports taking warfarin 1 mg daily in the past, but only had INR checked once yearly during that time. Per chart review, all INR levels were 0.9-1.1. Patient understands dosing directions and information discussed. Dosing schedule and follow up appointment given to patient. Progress note routed to referring physician's office. Patient acknowledges working in consult agreement with pharmacist as referred by his/her physician. Next Clinic Appointment:      Please call [facility] Medication Management Clinic at [phone] with any questions. Thanks!   Swetha Laureano, PharmD, [facility]  Medication Management Clinic   [place] Ph: [phone]  Mckenna Thomas Ph: [phone]  2021 8:41 AM      For Pharmacy Admin Tracking Only     Intervention Detail: Adherence Monitorin   Total # of Interventions Recommended: 0   Total # of Interventions Accepted: 0   Time Spent (min): 15

## 2021-07-26 ENCOUNTER — ANTI-COAG VISIT (OUTPATIENT)
Dept: PHARMACY | Age: 61
End: 2021-07-26
Payer: COMMERCIAL

## 2021-07-26 DIAGNOSIS — I82.4Y9 DEEP VEIN THROMBOSIS (DVT) OF PROXIMAL LOWER EXTREMITY, UNSPECIFIED CHRONICITY, UNSPECIFIED LATERALITY (HCC): Primary | ICD-10-CM

## 2021-07-26 LAB — INTERNATIONAL NORMALIZATION RATIO, POC: 2.4

## 2021-07-26 PROCEDURE — 85610 PROTHROMBIN TIME: CPT

## 2021-07-26 PROCEDURE — 99211 OFF/OP EST MAY X REQ PHY/QHP: CPT

## 2021-07-26 NOTE — PROGRESS NOTES
reported   Vitamin K intake Normally has ~3 servings of green, leafy vegetables per week  7/26/21: 1-3 servings/week    Recent vomiting/diarrhea/fever, changes in weight or activity level None reported   Tobacco or alcohol use Patient denies smoking  Patient reports having 2 drinks per week   Upcoming surgeries or procedures None reported     Assessment/Plan:  Patient's INR was therapeutic today (2.4). Patient denies any missed/extra doses, diet changes, medication changes, illness, or bleeding since last visit. Patient was instructed to continue warfarin 7.5 mg daily, except 5 mg on TuThSa. Repeat INR in 4 weeks. Patient was reminded to maintain consistent vitamin K intake and call with any bleeding, medication changes, or fever/vomiting/diarrhea. Patient understands dosing directions and information discussed. Dosing schedule and follow up appointment given to patient. Progress note routed to referring physician's office. Patient acknowledges working in consult agreement with pharmacist as referred by his/her physician. Next Clinic Appointment:  8/23    Please call [facility] Medication Management Clinic at [phone] with any questions. Thanks! Luis Luo, Pharm. D.  PGY1 Pharmacy Resident   7/26/2021 4:07 PM      For Pharmacy Admin Tracking Only     Total # of Interventions Recommended: 0   Total # of Interventions Accepted: 0   Time Spent (min): 15

## 2021-08-17 ENCOUNTER — HOSPITAL ENCOUNTER (OUTPATIENT)
Age: 61
Discharge: HOME | End: 2021-08-17
Payer: COMMERCIAL

## 2021-08-17 ENCOUNTER — HOSPITAL ENCOUNTER (OUTPATIENT)
Dept: GENERAL RADIOLOGY | Age: 61
Discharge: HOME | End: 2021-08-17
Payer: COMMERCIAL

## 2021-08-17 DIAGNOSIS — N20.0 KIDNEY STONE: ICD-10-CM

## 2021-08-17 PROCEDURE — 74018 RADEX ABDOMEN 1 VIEW: CPT

## 2021-08-23 ENCOUNTER — ANTI-COAG VISIT (OUTPATIENT)
Dept: PHARMACY | Age: 61
End: 2021-08-23
Payer: COMMERCIAL

## 2021-08-23 LAB — INTERNATIONAL NORMALIZATION RATIO, POC: 2.8

## 2021-08-23 PROCEDURE — 85610 PROTHROMBIN TIME: CPT

## 2021-08-23 PROCEDURE — 99211 OFF/OP EST MAY X REQ PHY/QHP: CPT

## 2021-08-23 NOTE — PROGRESS NOTES
ANTICOAGULATION SERVICE    Hemal Trinidad is a 62 y.o. male with PMHx significant for history of DVT (Feb 2011, Oct 2011, Dec 2012, Jan 2021 - surgically provoked), borderline protein S deficiency who presents to clinic 8/23/2021 for anticoagulation monitoring and adjustment. Patient has tried Xarelto, Eliquis, Pradaxa, and Savaysa in the past, but stopped due to GI distress.      Anticoagulation Indication(s):  DVT (acute, recurrent)  Referring Physician:  Dr. Cristina Lopez  Goal INR Range:  2-3  Duration of Anticoagulation Therapy:  Indefinite   Time of day dose taken:  AM  Product patient has at home:  warfarin 5 mg (peach)      INR Summary                           Warfarin regimen (mg)  Date INR   A/P    Sun Mon Tue Wed Thu Fri Sat Mg/wk  8/23 2.8 At goal, no change  7.5 7.5 5 7.5 5 7.5 5 45  7/26     2.4  At goal, no change  7.5 7.5 5 7.5 5 7.5 5 45  6/28 2.6 At goal, no change  7.5 7.5 5 7.5 5 7.5 5 45  6/7 3.0 At goal, no change  7.5 7.5 5 7.5 5 7.5 5 45  5/24 2.7 At goal, no change  7.5 7.5 5 7.5 5 7.5 5 45  5/19 2.6 At goal, decrease  7.5 7.5 5 7.5 5 7.5 5 45  5/14 1.6 Below goal, bolus+increase 7.5 7.5 5 7.5 5 10/7.5 7.5 47.5   5/10 1.7 Below goal, increase  5 7.5 5 5 5 7.5 5 40   5/6 1.2 Below goal, continue  5 5 5 5 5 5 5 35    Last CBC:  Lab Results   Component Value Date    RBC 4.25 (L) 07/08/2017    HGB 13.7 07/08/2017    HCT 41.5 07/08/2017    MCV 97.7 07/08/2017    MCH 32.3 07/08/2017    MPV 8.3 07/08/2017    RDW 14.0 07/08/2017     07/08/2017       Patient History:  Recent hospitalizations/HC visits 1/28 [facility] ED for acute LLE DVT: diagnosed ~1 week after left foot surgery; was off anticoagulation for ~1-2 years at the time; started on Xarelto   Recent medication changes Off Savaysa (bridging complete)   Medications taken regularly that may interact with warfarin or alter INR CoQ10, Fish Oil   Warfarin dose taken as prescribed Uses pillbox - reports some difficulty remembering PM meds, but

## 2021-09-20 ENCOUNTER — ANTI-COAG VISIT (OUTPATIENT)
Dept: PHARMACY | Age: 61
End: 2021-09-20
Payer: COMMERCIAL

## 2021-09-20 DIAGNOSIS — I82.4Y9 DEEP VEIN THROMBOSIS (DVT) OF PROXIMAL LOWER EXTREMITY, UNSPECIFIED CHRONICITY, UNSPECIFIED LATERALITY (HCC): Primary | ICD-10-CM

## 2021-09-20 LAB — INTERNATIONAL NORMALIZATION RATIO, POC: 2.7

## 2021-09-20 PROCEDURE — 99211 OFF/OP EST MAY X REQ PHY/QHP: CPT | Performed by: PHARMACIST

## 2021-09-20 PROCEDURE — 85610 PROTHROMBIN TIME: CPT | Performed by: PHARMACIST

## 2021-09-20 NOTE — PROGRESS NOTES
difficulty remembering PM meds, but takes warfarin in AM   Signs/symptoms of bleeding None reported   Vitamin K intake Normally has ~3 servings of green, leafy vegetables per week  7/26/21: 1-3 servings/week    Recent vomiting/diarrhea/fever, changes in weight or activity level None reported   Tobacco or alcohol use Patient denies smoking  Patient reports having 2 drinks per week   Upcoming surgeries or procedures None reported     Assessment/Plan:  Patient's INR was therapeutic today (2.7). Patient denies any missed/extra doses, diet changes, medication changes, illness, or bleeding since last visit. Patient was instructed to continue warfarin 7.5 mg daily, except 5 mg on TuThSa. Repeat INR in 4 weeks. Patient was reminded to maintain consistent vitamin K intake and call with any bleeding, medication changes, or fever/vomiting/diarrhea. Patient understands dosing directions and information discussed. Dosing schedule and follow up appointment given to patient. Progress note routed to referring physician's office. Patient acknowledges working in consult agreement with pharmacist as referred by his/her physician. Next Clinic Appointment:  10/18    Please call [facility] Medication Management Clinic at [phone] with any questions. Thanks!   Efren Delatorre, PharmD, [facility]  [facility] Medication Management [facility]  [place]: [phone]  [place]: [phone]  9/20/2021 4:30 PM      For Pharmacy Admin Tracking Only     Total # of Interventions Recommended: 0   Total # of Interventions Accepted: 0   Time Spent (min): 15

## 2021-10-18 ENCOUNTER — ANTI-COAG VISIT (OUTPATIENT)
Dept: PHARMACY | Age: 61
End: 2021-10-18
Payer: COMMERCIAL

## 2021-10-18 DIAGNOSIS — I82.4Y9 DEEP VEIN THROMBOSIS (DVT) OF PROXIMAL LOWER EXTREMITY, UNSPECIFIED CHRONICITY, UNSPECIFIED LATERALITY (HCC): Primary | ICD-10-CM

## 2021-10-18 LAB — INTERNATIONAL NORMALIZATION RATIO, POC: 4.8

## 2021-10-18 PROCEDURE — 85610 PROTHROMBIN TIME: CPT | Performed by: PHARMACIST

## 2021-10-18 PROCEDURE — 99212 OFFICE O/P EST SF 10 MIN: CPT | Performed by: PHARMACIST

## 2021-10-18 NOTE — PROGRESS NOTES
ANTICOAGULATION SERVICE    Renea Heredia is a 62 y.o. male with PMHx significant for history of DVT (Feb 2011, Oct 2011, Dec 2012, Jan 2021 - surgically provoked), borderline protein S deficiency who presents to clinic 10/18/2021 for anticoagulation monitoring and adjustment. Patient has tried Xarelto, Eliquis, Pradaxa, and Savaysa in the past, but stopped due to GI distress.      Anticoagulation Indication(s):  DVT (acute, recurrent)  Referring Physician:  Dr. Aby Schuler  Goal INR Range:  2-3  Duration of Anticoagulation Therapy:  Indefinite   Time of day dose taken:  AM  Product patient has at home:  warfarin 5 mg (peach)      INR Summary                           Warfarin regimen (mg)  Date INR   A/P    Sun Mon Tue Wed Thu Fri Sat Mg/wk  10/18 4.8 Above goal (Bactrim)  7.5 7.5 0/5 5/7.5 5 7.5 5 45  9/20 2.7 At goal, no change  7.5 7.5 5 7.5 5 7.5 5 45  8/23 2.8 At goal, no change  7.5 7.5 5 7.5 5 7.5 5 45  7/26     2.4  At goal, no change  7.5 7.5 5 7.5 5 7.5 5 45  6/28 2.6 At goal, no change  7.5 7.5 5 7.5 5 7.5 5 45  6/7 3.0 At goal, no change  7.5 7.5 5 7.5 5 7.5 5 45  5/24 2.7 At goal, no change  7.5 7.5 5 7.5 5 7.5 5 45  5/19 2.6 At goal, decrease  7.5 7.5 5 7.5 5 7.5 5 45  5/14 1.6 Below goal, bolus+increase 7.5 7.5 5 7.5 5 10/7.5 7.5 47.5   5/10 1.7 Below goal, increase  5 7.5 5 5 5 7.5 5 40   5/6 1.2 Below goal, continue  5 5 5 5 5 5 5 35    Last CBC:  Lab Results   Component Value Date    RBC 4.25 (L) 07/08/2017    HGB 13.7 07/08/2017    HCT 41.5 07/08/2017    MCV 97.7 07/08/2017    MCH 32.3 07/08/2017    MPV 8.3 07/08/2017    RDW 14.0 07/08/2017     07/08/2017       Patient History:  Recent hospitalizations/HC visits 10/11/21 PCP for suspected UTI, found to have kidney stone  1/28/21 [facility] ED for acute LLE DVT: diagnosed ~1 week after left foot surgery; was off anticoagulation for ~1-2 years at the time; started on Xarelto   Recent medication changes 10/12-10/18/21: Bactrim x7 days Medications taken regularly that may interact with warfarin or alter INR CoQ10, Fish Oil   Warfarin dose taken as prescribed Uses pillbox - reports some difficulty remembering PM meds, but takes warfarin in AM   Signs/symptoms of bleeding None reported   Vitamin K intake Normally has ~3 servings of green, leafy vegetables per week  7/26/21: 1-3 servings/week    Recent vomiting/diarrhea/fever, changes in weight or activity level None reported   Tobacco or alcohol use Patient denies smoking  Patient reports having 2 drinks per week  10/18/21: had 3 beers and bourbon last night    Upcoming surgeries or procedures None reported     Assessment/Plan:  Patient's INR was supratherapeutic today (4.8) likely due to recent course of Bactrim (finished today). Patient was thought to have a UTI so was started on Bactrim, but was later found to have a kidney stone which he passed yesterday. Patient also reports he drank more than usual (4 drinks yesterday) which may also be contributing to elevated INR today. Patient denies any missed/extra doses, diet changes, illness, or bleeding since last visit. Patient was instructed to hold warfarin tomorrow then take decreased dose of 5 mg on Wednesday then continue warfarin 7.5 mg daily, except 5 mg on TuThSa. Asked patient to notify clinic if he is started on any new medications in the future. Repeat INR in 2 weeks to assure INR returns to normal. Will extend out to 4 weeks if  Patient was reminded to maintain consistent vitamin K intake and call with any bleeding, medication changes, or fever/vomiting/diarrhea. Patient understands dosing directions and information discussed. Dosing schedule and follow up appointment given to patient. Progress note routed to referring physician's office. Patient acknowledges working in consult agreement with pharmacist as referred by his/her physician.      Next Clinic Appointment:  11/5    Please call [facility] Medication Management Clinic at [phone] with any questions. Thanks!   Jonnie Galan, PharmD, BCPS  [facility] Medication Management Clinic  [place]: [phone]  [facility]: [phone]  10/18/2021 4:05 PM      For Pharmacy Admin Tracking Only    Intervention Detail: Dose Adjustment: 1, reason: Interaction  Total # of Interventions Recommended: 1  Total # of Interventions Accepted: 1  Time Spent (min): 15

## 2021-11-05 ENCOUNTER — ANTI-COAG VISIT (OUTPATIENT)
Dept: PHARMACY | Age: 61
End: 2021-11-05
Payer: COMMERCIAL

## 2021-11-05 ENCOUNTER — TELEPHONE (OUTPATIENT)
Dept: PHARMACY | Age: 61
End: 2021-11-05

## 2021-11-05 DIAGNOSIS — I82.4Y9 DEEP VEIN THROMBOSIS (DVT) OF PROXIMAL LOWER EXTREMITY, UNSPECIFIED CHRONICITY, UNSPECIFIED LATERALITY (HCC): Primary | ICD-10-CM

## 2021-11-05 LAB — INTERNATIONAL NORMALIZATION RATIO, POC: 3.5

## 2021-11-05 PROCEDURE — 99212 OFFICE O/P EST SF 10 MIN: CPT | Performed by: PHARMACIST

## 2021-11-05 PROCEDURE — 85610 PROTHROMBIN TIME: CPT | Performed by: PHARMACIST

## 2021-11-05 NOTE — TELEPHONE ENCOUNTER
Patient no showed to anticoagulation clinic today. Called patient and LVM to reschedule INR check ASAP.      Karthik Lr, PharmD, BCPS  [facility] Medication Management Clinic  [place]: [phone]  [place]: [phone]  11/5/2021 9:57 AM

## 2021-11-05 NOTE — PROGRESS NOTES
medication changes 10/12-10/18/21: Bactrim x7 days     Medications taken regularly that may interact with warfarin or alter INR CoQ10, Fish Oil   Warfarin dose taken as prescribed Uses pillbox - reports some difficulty remembering PM meds, but takes warfarin in AM   Signs/symptoms of bleeding None reported   Vitamin K intake Normally has ~3 servings of green, leafy vegetables per week  7/26/21: 1-3 servings/week   11/5/21: 1-2 servings    Recent vomiting/diarrhea/fever, changes in weight or activity level None reported   Tobacco or alcohol use Patient denies smoking  Patient reports having 2 drinks per week on average. Drinks socially. 10/18/21: had 3 beers and bourbon last night   11/5/21: 2 glasses of bourbon and a glass of wine last night    Upcoming surgeries or procedures None reported     Assessment/Plan:  Patient's INR was supratherapeutic today (3.5). Patient broke his hand this week and was taking ~2 g of acetaminophen per day. Usually do not see effects on INR unless taking 3-4 g per day but may be playing a role in elevated level. Patient also drank 3 alcoholic beverages last night and recalls only eating one serving of vitamin K foods in the past week (usually 2-3 servings per week). INR was likely elevated last appointment due to Bactrim. INR had previously been stable on current dose. Patient denies any missed/extra doses, diet changes, illness, or bleeding since last visit. Patient was instructed to hold warfarin tomorrow then continue warfarin 7.5 mg daily, except 5 mg on TuThSa. Asked patient to notify clinic if he is started on any new medications in the future. Repeat INR in 2 weeks to assure INR returns to normal. Will extend out to 4 weeks if within goal range. Patient was reminded to maintain consistent vitamin K intake and call with any bleeding, medication changes, or fever/vomiting/diarrhea. Patient understands dosing directions and information discussed.  Dosing schedule and follow up appointment given to patient. Progress note routed to referring physician's office. Patient acknowledges working in consult agreement with pharmacist as referred by his/her physician. Next Clinic Appointment:  11/22    Please call [facility] Medication Management Clinic at [phone] with any questions. Thanks!   Latoya Mitchell, PharmD, BCPS  [facility] Medication Management Clinic  [place]: [phone]  [place]: [phone]  11/5/2021 4:02 PM     For Pharmacy Admin Tracking Only   Intervention Detail: Dose Adjustment: 1, reason: Therapy Optimization   Total # of Interventions Recommended: 1   Total # of Interventions Accepted: 1   Time Spent (min): 15

## 2021-11-22 ENCOUNTER — TELEPHONE (OUTPATIENT)
Dept: PHARMACY | Age: 61
End: 2021-11-22

## 2021-11-22 NOTE — TELEPHONE ENCOUNTER
Patient no showed to anticoagulation clinic today. Called patient and LVM to reschedule INR check ASAP.      Kacie Patel, PharmD, BCPS  [facility] Medication Management Clinic  [place]: [phone]  [place]: [phone]  11/22/2021 5:38 PM

## 2021-11-23 NOTE — TELEPHONE ENCOUNTER
Pt r/s 11/24.     Carly Betancur, PharmD, [facility]  Medication Management Clinic   [facility] [place] [address] Ph: [phone]  [facility] Ph: [phone]  11/23/2021 10:48 AM

## 2021-11-24 ENCOUNTER — ANTI-COAG VISIT (OUTPATIENT)
Dept: PHARMACY | Age: 61
End: 2021-11-24
Payer: COMMERCIAL

## 2021-11-24 DIAGNOSIS — I82.4Y9 DEEP VEIN THROMBOSIS (DVT) OF PROXIMAL LOWER EXTREMITY, UNSPECIFIED CHRONICITY, UNSPECIFIED LATERALITY (HCC): Primary | ICD-10-CM

## 2021-11-24 LAB — INTERNATIONAL NORMALIZATION RATIO, POC: 2.6

## 2021-11-24 PROCEDURE — 85610 PROTHROMBIN TIME: CPT

## 2021-11-24 PROCEDURE — 99211 OFF/OP EST MAY X REQ PHY/QHP: CPT

## 2021-11-24 NOTE — PROGRESS NOTES
ANTICOAGULATION SERVICE    Irina Pat is a 62 y.o. male with PMHx significant for history of DVT (Feb 2011, Oct 2011, Dec 2012, Jan 2021 - surgically provoked), borderline protein S deficiency who presents to clinic 11/24/2021 for anticoagulation monitoring and adjustment. Patient has tried Xarelto, Eliquis, Pradaxa, and Savaysa in the past, but stopped due to GI distress.      Anticoagulation Indication(s):  DVT (acute, recurrent)  Referring Physician:  Dr. Heike Lopez  Goal INR Range:  2-3  Duration of Anticoagulation Therapy:  Indefinite   Time of day dose taken:  AM  Product patient has at home:  warfarin 5 mg (peach)    INR Summary                           Warfarin regimen (mg)  Date INR   A/P    Sun Mon Tue Wed Thu Fri Sat Mg/wk  11/24 2.6 At goal, no change  7.5 7.5 5 7.5 5 7.5 5 45  11/5 3.5 Above goal, hold x1  7.5 7.5 5 7.5 5 7.5 0/5 45  10/18 4.8 Above goal (Bactrim)  7.5 7.5 0/5 5/7.5 5 7.5 5 45  9/20 2.7 At goal, no change  7.5 7.5 5 7.5 5 7.5 5 45  8/23 2.8 At goal, no change  7.5 7.5 5 7.5 5 7.5 5 45  7/26     2.4  At goal, no change  7.5 7.5 5 7.5 5 7.5 5 45  6/28 2.6 At goal, no change  7.5 7.5 5 7.5 5 7.5 5 45  6/7 3.0 At goal, no change  7.5 7.5 5 7.5 5 7.5 5 45  5/24 2.7 At goal, no change  7.5 7.5 5 7.5 5 7.5 5 45  5/19 2.6 At goal, decrease  7.5 7.5 5 7.5 5 7.5 5 45  5/14 1.6 Below goal, bolus+increase 7.5 7.5 5 7.5 5 10/7.5 7.5 47.5   5/10 1.7 Below goal, increase  5 7.5 5 5 5 7.5 5 40   5/6 1.2 Below goal, continue  5 5 5 5 5 5 5 35    Last CBC:  Lab Results   Component Value Date    RBC 4.25 (L) 07/08/2017    HGB 13.7 07/08/2017    HCT 41.5 07/08/2017    MCV 97.7 07/08/2017    MCH 32.3 07/08/2017    MPV 8.3 07/08/2017    RDW 14.0 07/08/2017     07/08/2017       Patient History:  Recent hospitalizations/HC visits 10/11/21 PCP for suspected UTI, found to have kidney stone  1/28/21 [facility] ED for acute LLE DVT: diagnosed ~1 week after left foot surgery; was off anticoagulation for ~1-2 years at the time; started on Xarelto   Recent medication changes 10/12-10/18/21: Bactrim x7 days     Medications taken regularly that may interact with warfarin or alter INR CoQ10, Fish Oil   Warfarin dose taken as prescribed Uses pillbox - reports some difficulty remembering PM meds, but takes warfarin in AM   Signs/symptoms of bleeding None reported   Vitamin K intake Normally has ~3 servings of green, leafy vegetables per week  7/26/21: 1-3 servings/week   11/5/21: 1-2 servings    Recent vomiting/diarrhea/fever, changes in weight or activity level None reported   Tobacco or alcohol use Patient denies smoking  Patient reports having 2 drinks per week on average. Drinks socially. 10/18/21: had 3 beers and bourbon last night   11/5/21: 2 glasses of bourbon and a glass of wine last night    Upcoming surgeries or procedures None reported     Assessment/Plan:  Patient's INR was therapeutic today (2.6). Patient denies any missed/extra doses, diet changes, illness, or bleeding since last visit. Patient was instructed to continue warfarin 7.5 mg daily, except 5 mg on TuThSa. Asked patient to notify clinic if he is started on any new medications in the future. Repeat INR in 4 weeks. Patient was reminded to maintain consistent vitamin K intake and call with any bleeding, medication changes, or fever/vomiting/diarrhea. Patient understands dosing directions and information discussed. Dosing schedule and follow up appointment given to patient. Progress note routed to referring physician's office. Patient acknowledges working in consult agreement with pharmacist as referred by his/her physician. Next Clinic Appointment:  12/22    Please call [facility] at [phone] with any questions. Thanks!   Gina Julio, PharmD  PGY1 Pharmacy Resident  [facility]  11/24/2021 4:14 PM    For Pharmacy Admin Tracking Only     Total # of Interventions Recommended: 0   Total # of Interventions Accepted: 0   Time Spent (min): 15

## 2021-12-22 ENCOUNTER — ANTI-COAG VISIT (OUTPATIENT)
Dept: PHARMACY | Age: 61
End: 2021-12-22
Payer: COMMERCIAL

## 2021-12-22 DIAGNOSIS — I82.4Y9 DEEP VEIN THROMBOSIS (DVT) OF PROXIMAL LOWER EXTREMITY, UNSPECIFIED CHRONICITY, UNSPECIFIED LATERALITY (HCC): Primary | ICD-10-CM

## 2021-12-22 LAB — INTERNATIONAL NORMALIZATION RATIO, POC: 2.3

## 2021-12-22 PROCEDURE — 99211 OFF/OP EST MAY X REQ PHY/QHP: CPT

## 2021-12-22 PROCEDURE — 85610 PROTHROMBIN TIME: CPT

## 2021-12-22 NOTE — PROGRESS NOTES
ANTICOAGULATION SERVICE    Ashu Meier is a 62 y.o. male with PMHx significant for history of DVT (Feb 2011, Oct 2011, Dec 2012, Jan 2021 - surgically provoked), borderline protein S deficiency who presents to clinic 12/22/2021 for anticoagulation monitoring and adjustment. Patient has tried Xarelto, Eliquis, Pradaxa, and Savaysa in the past, but stopped due to GI distress.      Anticoagulation Indication(s):  DVT (acute, recurrent)  Referring Physician:  Dr. Giulia De La Paz  Goal INR Range:  2-3  Duration of Anticoagulation Therapy:  Indefinite   Time of day dose taken:  AM  Product patient has at home:  warfarin 5 mg (peach)    INR Summary                           Warfarin regimen (mg)  Date INR   A/P    Sun Mon Tue Wed Thu Fri Sat Mg/wk  12/22 2.3 At goal, no change  7.5 7.5 5 7.5 5 7.5 5 45  11/24 2.6 At goal, no change  7.5 7.5 5 7.5 5 7.5 5 45  11/5 3.5 Above goal, hold x1  7.5 7.5 5 7.5 5 7.5 0/5 45  10/18 4.8 Above goal (Bactrim)  7.5 7.5 0/5 5/7.5 5 7.5 5 45  9/20 2.7 At goal, no change  7.5 7.5 5 7.5 5 7.5 5 45  8/23 2.8 At goal, no change  7.5 7.5 5 7.5 5 7.5 5 45  7/26     2.4  At goal, no change  7.5 7.5 5 7.5 5 7.5 5 45  6/28 2.6 At goal, no change  7.5 7.5 5 7.5 5 7.5 5 45  6/7 3.0 At goal, no change  7.5 7.5 5 7.5 5 7.5 5 45  5/24 2.7 At goal, no change  7.5 7.5 5 7.5 5 7.5 5 45  5/19 2.6 At goal, decrease  7.5 7.5 5 7.5 5 7.5 5 45  5/14 1.6 Below goal, bolus+increase 7.5 7.5 5 7.5 5 10/7.5 7.5 47.5   5/10 1.7 Below goal, increase  5 7.5 5 5 5 7.5 5 40   5/6 1.2 Below goal, continue  5 5 5 5 5 5 5 35    Last CBC:  Lab Results   Component Value Date    RBC 4.25 (L) 07/08/2017    HGB 13.7 07/08/2017    HCT 41.5 07/08/2017    MCV 97.7 07/08/2017    MCH 32.3 07/08/2017    MPV 8.3 07/08/2017    RDW 14.0 07/08/2017     07/08/2017       Patient History:  Recent hospitalizations/HC visits 10/11/21 PCP for suspected UTI, found to have kidney stone  1/28/21 [facility] ED for acute LLE DVT: diagnosed ~1 week after left foot surgery; was off anticoagulation for ~1-2 years at the time; started on Xarelto   Recent medication changes 10/12-10/18/21: Bactrim x7 days     Medications taken regularly that may interact with warfarin or alter INR CoQ10, Fish Oil   Warfarin dose taken as prescribed Uses pillbox - reports some difficulty remembering PM meds, but takes warfarin in AM   Signs/symptoms of bleeding None reported   Vitamin K intake Normally has ~3 servings of green, leafy vegetables per week  7/26/21: 1-3 servings/week   11/5/21: 1-2 servings    Recent vomiting/diarrhea/fever, changes in weight or activity level None reported   Tobacco or alcohol use Patient denies smoking  Patient reports having 2 drinks per week on average. Drinks socially. 10/18/21: had 3 beers and bourbon last night   11/5/21: 2 glasses of bourbon and a glass of wine last night    Upcoming surgeries or procedures None reported     Assessment/Plan:  Patient's INR was therapeutic today (2.3). Patient denies any missed/extra doses, diet changes, illness, or bleeding since last visit. Patient was instructed to continue warfarin 7.5 mg daily, except 5 mg on TuThSa. Asked patient to notify clinic if he is started on any new medications in the future. Repeat INR in 4 weeks. Patient was reminded to maintain consistent vitamin K intake and call with any bleeding, medication changes, or fever/vomiting/diarrhea. Patient understands dosing directions and information discussed. Dosing schedule and follow up appointment given to patient. Progress note routed to referring physician's office. Patient acknowledges working in consult agreement with pharmacist as referred by his/her physician. Next Clinic Appointment:  1/19    Please call [facility] at [phone] with any questions. Thanks!   Patria Arcos, PharmD  PGY1 Pharmacy Resident  [facility]  12/22/2021 4:24 PM    For Pharmacy Admin Tracking Only     Total # of Interventions Recommended: 0   Total # of Interventions Accepted: 0   Time Spent (min): 15

## 2022-01-19 ENCOUNTER — ANTI-COAG VISIT (OUTPATIENT)
Dept: PHARMACY | Age: 62
End: 2022-01-19
Payer: COMMERCIAL

## 2022-01-19 DIAGNOSIS — I82.4Y9 DEEP VEIN THROMBOSIS (DVT) OF PROXIMAL LOWER EXTREMITY, UNSPECIFIED CHRONICITY, UNSPECIFIED LATERALITY (HCC): Primary | ICD-10-CM

## 2022-01-19 LAB — INTERNATIONAL NORMALIZATION RATIO, POC: 2.8

## 2022-01-19 PROCEDURE — 99211 OFF/OP EST MAY X REQ PHY/QHP: CPT | Performed by: PHARMACIST

## 2022-01-19 PROCEDURE — 85610 PROTHROMBIN TIME: CPT | Performed by: PHARMACIST

## 2022-01-19 NOTE — PROGRESS NOTES
ANTICOAGULATION SERVICE    Karina Rodríguez is a 62 y.o. male with PMHx significant for history of DVT (Feb 2011, Oct 2011, Dec 2012, Jan 2021 - surgically provoked), borderline protein S deficiency who presents to clinic 1/19/2022 for anticoagulation monitoring and adjustment. Patient has tried Xarelto, Eliquis, Pradaxa, and Savaysa in the past, but stopped due to GI distress.      Anticoagulation Indication(s):  DVT (acute, recurrent)  Referring Physician:  Dr. Brittani Carrillo  Goal INR Range:  2-3  Duration of Anticoagulation Therapy:  Indefinite   Time of day dose taken:  AM  Product patient has at home:  warfarin 5 mg (peach)    INR Summary                           Warfarin regimen (mg)  Date INR   A/P    Sun Mon Tue Wed Thu Fri Sat Mg/wk  1/19 2.8 At goal, no change  7.5 7.5 5 7.5 5 7.5 5 45  12/22 2.3 At goal, no change  7.5 7.5 5 7.5 5 7.5 5 45  11/24 2.6 At goal, no change  7.5 7.5 5 7.5 5 7.5 5 45  11/5 3.5 Above goal, hold x1  7.5 7.5 5 7.5 5 7.5 0/5 45  10/18 4.8 Above goal (Bactrim)  7.5 7.5 0/5 5/7.5 5 7.5 5 45  9/20 2.7 At goal, no change  7.5 7.5 5 7.5 5 7.5 5 45  8/23 2.8 At goal, no change  7.5 7.5 5 7.5 5 7.5 5 45  7/26     2.4  At goal, no change  7.5 7.5 5 7.5 5 7.5 5 45  6/28 2.6 At goal, no change  7.5 7.5 5 7.5 5 7.5 5 45  6/7 3.0 At goal, no change  7.5 7.5 5 7.5 5 7.5 5 45  5/24 2.7 At goal, no change  7.5 7.5 5 7.5 5 7.5 5 45  5/19 2.6 At goal, decrease  7.5 7.5 5 7.5 5 7.5 5 45  5/14 1.6 Below goal, bolus+increase 7.5 7.5 5 7.5 5 10/7.5 7.5 47.5   5/10 1.7 Below goal, increase  5 7.5 5 5 5 7.5 5 40   5/6 1.2 Below goal, continue  5 5 5 5 5 5 5 35    Last CBC:  Lab Results   Component Value Date    RBC 4.25 (L) 07/08/2017    HGB 13.7 07/08/2017    HCT 41.5 07/08/2017    MCV 97.7 07/08/2017    MCH 32.3 07/08/2017    MPV 8.3 07/08/2017    RDW 14.0 07/08/2017     07/08/2017       Patient History:  Recent hospitalizations/HC visits 10/11/21 PCP for suspected UTI, found to have kidney stone  1/28/21 [facility] ED for acute LLE DVT: diagnosed ~1 week after left foot surgery; was off anticoagulation for ~1-2 years at the time; started on Xarelto   Recent medication changes 1/19/22: started metformin prn weight loss   10/12-10/18/21: Bactrim x7 days     Medications taken regularly that may interact with warfarin or alter INR CoQ10, Fish Oil   Warfarin dose taken as prescribed Uses pillbox - reports some difficulty remembering PM meds, but takes warfarin in AM   Signs/symptoms of bleeding None reported   Vitamin K intake Normally has ~3 servings of green, leafy vegetables per week  7/26/21: 1-3 servings/week   11/5/21: 1-2 servings    Recent vomiting/diarrhea/fever, changes in weight or activity level None reported   Tobacco or alcohol use Patient denies smoking  Patient reports having 2 drinks per week on average. Drinks socially. 10/18/21: had 3 beers and bourbon last night   11/5/21: 2 glasses of bourbon and a glass of wine last night    Upcoming surgeries or procedures None reported     Assessment/Plan:  Patient's INR was therapeutic today (2.8). Patient denies any missed/extra doses, diet changes, illness, or bleeding since last visit. Patient was instructed to continue warfarin 7.5 mg daily, except 5 mg on TuThSa. Repeat INR in 4 weeks. Patient was reminded to maintain consistent vitamin K intake and call with any bleeding, medication changes, or fever/vomiting/diarrhea. Patient understands dosing directions and information discussed. Dosing schedule and follow up appointment given to patient. Progress note routed to referring physician's office. Patient acknowledges working in consult agreement with pharmacist as referred by his/her physician. Next Clinic Appointment:  2/17    Please call [facility]ticoagulation [facility] at [phone] with any questions. Thanks!   Sienna Rodgers, PharmD, BCPS  [facility]  [place]: [phone]  [place]: [phone]  1/19/2022 4:35 PM     For Pharmacy Admin Tracking Only     Total # of Interventions Recommended: 0   Total # of Interventions Accepted: 0   Time Spent (min): 15

## 2022-01-28 ENCOUNTER — TELEPHONE (OUTPATIENT)
Dept: PHARMACY | Age: 62
End: 2022-01-28

## 2022-01-28 NOTE — TELEPHONE ENCOUNTER
Dr. Isma Courtney has left [facility], patient is now seeing Dr. Christian Mena. Sent CPA and new referral for Dr. Leo Stanley to sign for patient to continue care with the anticoagulation clinic.      Dianna Gonzalez, PharmD, [facility]  [facility]  [place]: [phone]  [place]: [phone]  1/28/2022 1:52 PM

## 2022-02-01 NOTE — TELEPHONE ENCOUNTER
Have not received, re-faxed forms.      Syed Rehman, PharmD, [facility]  [facility]  [place]: [phone]  [place]: [phone]  2/1/2022 3:55 PM

## 2022-02-09 DIAGNOSIS — I82.4Y9 DEEP VEIN THROMBOSIS (DVT) OF PROXIMAL LOWER EXTREMITY, UNSPECIFIED CHRONICITY, UNSPECIFIED LATERALITY (HCC): Primary | ICD-10-CM

## 2022-02-09 NOTE — TELEPHONE ENCOUNTER
Referral and CPA received. Faxed into media tab.      Franca Krishnan, PharmD, BCPS  [address] [facility]  [place]: [phone]  [employer]: [phone]  2/9/2022 12:33 PM

## 2022-02-16 ENCOUNTER — ANTI-COAG VISIT (OUTPATIENT)
Dept: PHARMACY | Age: 62
End: 2022-02-16
Payer: COMMERCIAL

## 2022-02-16 DIAGNOSIS — I82.409 DEEP VEIN THROMBOSIS (DVT) OF LOWER EXTREMITY, UNSPECIFIED CHRONICITY, UNSPECIFIED LATERALITY, UNSPECIFIED VEIN (HCC): Primary | ICD-10-CM

## 2022-02-16 LAB — INTERNATIONAL NORMALIZATION RATIO, POC: 2.4

## 2022-02-16 PROCEDURE — 99211 OFF/OP EST MAY X REQ PHY/QHP: CPT

## 2022-02-16 PROCEDURE — 85610 PROTHROMBIN TIME: CPT

## 2022-02-16 NOTE — PROGRESS NOTES
ANTICOAGULATION SERVICE    Rishi Montes is a 62 y.o. male with PMHx significant for history of DVT (Feb 2011, Oct 2011, Dec 2012, Jan 2021 - surgically provoked), borderline protein S deficiency who presents to clinic 2/16/2022 for anticoagulation monitoring and adjustment. Patient has tried Xarelto, Eliquis, Pradaxa, and Savaysa in the past, but stopped due to GI distress.      Anticoagulation Indication(s):  DVT (acute, recurrent)  Referring Physician:  Dr. Jodie Rae  Goal INR Range:  2-3  Duration of Anticoagulation Therapy:  Indefinite   Time of day dose taken:  AM  Product patient has at home:  warfarin 5 mg (peach)    INR Summary                           Warfarin regimen (mg)  Date INR   A/P    Sun Mon Tue Wed Thu Fri Sat Mg/wk  2/16 2.4 At goal, no change  7.5 7.5 5 7.5 5 7.5 5 45  1/19 2.8 At goal, no change  7.5 7.5 5 7.5 5 7.5 5 45  12/22 2.3 At goal, no change  7.5 7.5 5 7.5 5 7.5 5 45  11/24 2.6 At goal, no change  7.5 7.5 5 7.5 5 7.5 5 45  11/5 3.5 Above goal, hold x1  7.5 7.5 5 7.5 5 7.5 0/5 45  10/18 4.8 Above goal (Bactrim)  7.5 7.5 0/5 5/7.5 5 7.5 5 45  9/20 2.7 At goal, no change  7.5 7.5 5 7.5 5 7.5 5 45  8/23 2.8 At goal, no change  7.5 7.5 5 7.5 5 7.5 5 45  7/26     2.4  At goal, no change  7.5 7.5 5 7.5 5 7.5 5 45  6/28 2.6 At goal, no change  7.5 7.5 5 7.5 5 7.5 5 45  6/7 3.0 At goal, no change  7.5 7.5 5 7.5 5 7.5 5 45  5/24 2.7 At goal, no change  7.5 7.5 5 7.5 5 7.5 5 45  5/19 2.6 At goal, decrease  7.5 7.5 5 7.5 5 7.5 5 45  5/14 1.6 Below goal, bolus+increase 7.5 7.5 5 7.5 5 10/7.5 7.5 47.5   5/10 1.7 Below goal, increase  5 7.5 5 5 5 7.5 5 40   5/6 1.2 Below goal, continue  5 5 5 5 5 5 5 35    Last CBC:  Lab Results   Component Value Date    RBC 4.25 (L) 07/08/2017    HGB 13.7 07/08/2017    HCT 41.5 07/08/2017    MCV 97.7 07/08/2017    MCH 32.3 07/08/2017    MPV 8.3 07/08/2017    RDW 14.0 07/08/2017     07/08/2017       Patient History:  Recent hospitalizations/HC visits 10/11/21 PCP for suspected UTI, found to have kidney stone  1/28/21 [facility] ED for acute LLE DVT: diagnosed ~1 week after left foot surgery; was off anticoagulation for ~1-2 years at the time; started on Xarelto   Recent medication changes 2/16/22: was on Z-surendra for COVID-19  1/19/22: started metformin prn weight loss   10/12-10/18/21: Bactrim x7 days     Medications taken regularly that may interact with warfarin or alter INR CoQ10, Fish Oil   Warfarin dose taken as prescribed Uses pillbox - reports some difficulty remembering PM meds, but takes warfarin in AM   Signs/symptoms of bleeding None reported  2/16/22: had hematoma on forearm a few weeks ago, has resolved    Vitamin K intake Normally has ~3 servings of green, leafy vegetables per week  7/26/21: 1-3 servings/week   11/5/21: 1-2 servings    Recent vomiting/diarrhea/fever, changes in weight or activity level None reported   Tobacco or alcohol use Patient denies smoking  Patient reports having 2 drinks per week on average. Drinks socially. 10/18/21: had 3 beers and bourbon last night   11/5/21: 2 glasses of bourbon and a glass of wine last night    Upcoming surgeries or procedures None reported     Assessment/Plan:  Patient's INR was therapeutic today (2.4). Patient denies any missed/extra doses, diet changes, illness, or bleeding since last visit. Patient was instructed to continue warfarin 7.5 mg daily, except 5 mg on TuThSa. Repeat INR in 4 weeks. Patient was reminded to maintain consistent vitamin K intake and call with any bleeding, medication changes, or fever/vomiting/diarrhea. Patient understands dosing directions and information discussed. Dosing schedule and follow up appointment given to patient. Progress note routed to referring physician's office. Patient acknowledges working in consult agreement with pharmacist as referred by his/her physician.      Next Clinic Appointment:  3/16    Please call [facility]ticoagulation Clinic at [phone] with any questions. Thanks!   Anju Gomes, PharmD  [place]: [address]: [phone]  2/16/2022 4:20 PM     For Pharmacy Admin Tracking Only   Total # of Interventions Recommended: 0   Total # of Interventions Accepted: 0   Time Spent (min): 15

## 2022-03-07 DIAGNOSIS — I82.409 DEEP VEIN THROMBOSIS (DVT) OF LOWER EXTREMITY, UNSPECIFIED CHRONICITY, UNSPECIFIED LATERALITY, UNSPECIFIED VEIN (HCC): Primary | ICD-10-CM

## 2022-03-16 ENCOUNTER — ANTI-COAG VISIT (OUTPATIENT)
Dept: PHARMACY | Age: 62
End: 2022-03-16
Payer: COMMERCIAL

## 2022-03-16 DIAGNOSIS — I82.4Z9 DEEP VEIN THROMBOSIS (DVT) OF DISTAL VEIN OF LOWER EXTREMITY, UNSPECIFIED CHRONICITY, UNSPECIFIED LATERALITY (HCC): Primary | ICD-10-CM

## 2022-03-16 LAB — INTERNATIONAL NORMALIZATION RATIO, POC: 2.2

## 2022-03-16 PROCEDURE — 99211 OFF/OP EST MAY X REQ PHY/QHP: CPT

## 2022-03-16 PROCEDURE — 85610 PROTHROMBIN TIME: CPT

## 2022-03-16 NOTE — PROGRESS NOTES
ANTICOAGULATION SERVICE    Fatuma Berry is a 65 y.o. male with PMHx significant for history of DVT (Feb 2011, Oct 2011, Dec 2012, Jan 2021 - surgically provoked), borderline protein S deficiency who presents to clinic 3/16/2022 for anticoagulation monitoring and adjustment. Patient has tried Xarelto, Eliquis, Pradaxa, and Savaysa in the past, but stopped due to GI distress.      Anticoagulation Indication(s):  DVT (acute, recurrent)  Referring Physician:  Dr. Zaynab Recinos  Goal INR Range:  2-3  Duration of Anticoagulation Therapy:  Indefinite   Time of day dose taken:  AM  Product patient has at home:  warfarin 5 mg (peach)    INR Summary                           Warfarin regimen (mg)  Date INR   A/P    Sun Mon Tue Wed Thu Fri Sat Mg/wk  3/16 2.2 At goal, no change  7.5 7.5 5 7.5 5 7.5 5 45   2/16 2.4 At goal, no change  7.5 7.5 5 7.5 5 7.5 5 45  1/19 2.8 At goal, no change  7.5 7.5 5 7.5 5 7.5 5 45  12/22 2.3 At goal, no change  7.5 7.5 5 7.5 5 7.5 5 45  11/24 2.6 At goal, no change  7.5 7.5 5 7.5 5 7.5 5 45  11/5 3.5 Above goal, hold x1  7.5 7.5 5 7.5 5 7.5 0/5 45  10/18 4.8 Above goal (Bactrim)  7.5 7.5 0/5 5/7.5 5 7.5 5 45  9/20 2.7 At goal, no change  7.5 7.5 5 7.5 5 7.5 5 45  8/23 2.8 At goal, no change  7.5 7.5 5 7.5 5 7.5 5 45  7/26     2.4  At goal, no change  7.5 7.5 5 7.5 5 7.5 5 45  6/28 2.6 At goal, no change  7.5 7.5 5 7.5 5 7.5 5 45  6/7 3.0 At goal, no change  7.5 7.5 5 7.5 5 7.5 5 45  5/24 2.7 At goal, no change  7.5 7.5 5 7.5 5 7.5 5 45  5/19 2.6 At goal, decrease  7.5 7.5 5 7.5 5 7.5 5 45  5/14 1.6 Below goal, bolus+increase 7.5 7.5 5 7.5 5 10/7.5 7.5 47.5   5/10 1.7 Below goal, increase  5 7.5 5 5 5 7.5 5 40   5/6 1.2 Below goal, continue  5 5 5 5 5 5 5 35    Last CBC:  Lab Results   Component Value Date    RBC 4.25 (L) 07/08/2017    HGB 13.7 07/08/2017    HCT 41.5 07/08/2017    MCV 97.7 07/08/2017    MCH 32.3 07/08/2017    MPV 8.3 07/08/2017    RDW 14.0 07/08/2017     07/08/2017 Patient History:  Recent hospitalizations/HC visits 10/11/21 PCP for suspected UTI, found to have kidney stone  1/28/21 [facility] ED for acute LLE DVT: diagnosed ~1 week after left foot surgery; was off anticoagulation for ~1-2 years at the time; started on Xarelto   Recent medication changes 2/16/22: was on Z-surendra for COVID-19  1/19/22: started metformin prn weight loss   10/12-10/18/21: Bactrim x7 days     Medications taken regularly that may interact with warfarin or alter INR CoQ10, Fish Oil   Warfarin dose taken as prescribed Uses pillbox - reports some difficulty remembering PM meds, but takes warfarin in AM   Signs/symptoms of bleeding None reported  2/16/22: had hematoma on forearm a few weeks ago, has resolved    Vitamin K intake Normally has ~3 servings of green, leafy vegetables per week  7/26/21: 1-3 servings/week   11/5/21: 1-2 servings    Recent vomiting/diarrhea/fever, changes in weight or activity level None reported   Tobacco or alcohol use Patient denies smoking  Patient reports having 2 drinks per week on average. Drinks socially. 10/18/21: had 3 beers and bourbon last night   11/5/21: 2 glasses of bourbon and a glass of wine last night    Upcoming surgeries or procedures None reported     Assessment/Plan:  Patient's INR was therapeutic today (2.2). Patient denies any missed/extra doses, diet changes, illness, or bleeding since last visit. Patient was instructed to continue warfarin 7.5 mg daily, except 5 mg on TuThSa. Repeat INR in 4 weeks. Patient was reminded to maintain consistent vitamin K intake and call with any bleeding, medication changes, or fever/vomiting/diarrhea. Patient understands dosing directions and information discussed. Dosing schedule and follow up appointment given to patient. Progress note routed to referring physician's office. Patient acknowledges working in consult agreement with pharmacist as referred by his/her physician.      Next Clinic Appointment: 4/27    Please call [facility] Anticoagulation Clinic at [phone] with any questions. Thanks!   Yung Maurice PharmD  [place]: [address]: [phone]  3/16/2022 4:16 PM     For Pharmacy Admin Tracking Only   Total # of Interventions Recommended: 0   Total # of Interventions Accepted: 0   Time Spent (min): 15

## 2022-04-20 ENCOUNTER — TELEPHONE (OUTPATIENT)
Dept: PHARMACY | Age: 62
End: 2022-04-20

## 2022-04-27 ENCOUNTER — ANTI-COAG VISIT (OUTPATIENT)
Dept: PHARMACY | Age: 62
End: 2022-04-27
Payer: COMMERCIAL

## 2022-04-27 DIAGNOSIS — I82.4Z9 DEEP VEIN THROMBOSIS (DVT) OF DISTAL VEIN OF LOWER EXTREMITY, UNSPECIFIED CHRONICITY, UNSPECIFIED LATERALITY (HCC): Primary | ICD-10-CM

## 2022-04-27 LAB — INTERNATIONAL NORMALIZATION RATIO, POC: 2.6

## 2022-04-27 PROCEDURE — 85610 PROTHROMBIN TIME: CPT

## 2022-04-27 PROCEDURE — 99211 OFF/OP EST MAY X REQ PHY/QHP: CPT

## 2022-04-27 NOTE — PROGRESS NOTES
ANTICOAGULATION SERVICE    Nelda Hollingsworth is a 65 y.o. male with PMHx significant for history of DVT (Feb 2011, Oct 2011, Dec 2012, Jan 2021 - surgically provoked), borderline protein S deficiency who presents to clinic 4/27/2022 for anticoagulation monitoring and adjustment. Patient has tried Xarelto, Eliquis, Pradaxa, and Savaysa in the past, but stopped due to GI distress.      Anticoagulation Indication(s):  DVT (acute, recurrent)  Referring Physician:  Dr. Shannon Hernandez  Goal INR Range:  2-3  Duration of Anticoagulation Therapy:  Indefinite   Time of day dose taken:  AM  Product patient has at home:  warfarin 5 mg (peach)    INR Summary                           Warfarin regimen (mg)  Date INR   A/P    Sun Mon Tue Wed Thu Fri Sat Mg/wk  4/27 2.6 At goal, no change  7.5 7.5 5 7.5 5 7.5 5 45  3/16 2.2 At goal, no change  7.5 7.5 5 7.5 5 7.5 5 45   2/16 2.4 At goal, no change  7.5 7.5 5 7.5 5 7.5 5 45  1/19 2.8 At goal, no change  7.5 7.5 5 7.5 5 7.5 5 45  12/22 2.3 At goal, no change  7.5 7.5 5 7.5 5 7.5 5 45  11/24 2.6 At goal, no change  7.5 7.5 5 7.5 5 7.5 5 45  11/5 3.5 Above goal, hold x1  7.5 7.5 5 7.5 5 7.5 0/5 45  10/18 4.8 Above goal (Bactrim)  7.5 7.5 0/5 5/7.5 5 7.5 5 45  9/20 2.7 At goal, no change  7.5 7.5 5 7.5 5 7.5 5 45  8/23 2.8 At goal, no change  7.5 7.5 5 7.5 5 7.5 5 45  7/26     2.4  At goal, no change  7.5 7.5 5 7.5 5 7.5 5 45  6/28 2.6 At goal, no change  7.5 7.5 5 7.5 5 7.5 5 45  6/7 3.0 At goal, no change  7.5 7.5 5 7.5 5 7.5 5 45  5/24 2.7 At goal, no change  7.5 7.5 5 7.5 5 7.5 5 45  5/19 2.6 At goal, decrease  7.5 7.5 5 7.5 5 7.5 5 45  5/14 1.6 Below goal, bolus+increase 7.5 7.5 5 7.5 5 10/7.5 7.5 47.5   5/10 1.7 Below goal, increase  5 7.5 5 5 5 7.5 5 40   5/6 1.2 Below goal, continue  5 5 5 5 5 5 5 35    Last CBC:  Lab Results   Component Value Date    RBC 4.25 (L) 07/08/2017    HGB 13.7 07/08/2017    HCT 41.5 07/08/2017    MCV 97.7 07/08/2017    MCH 32.3 07/08/2017    MPV 8.3 07/08/2017    RDW 14.0 07/08/2017     07/08/2017       Patient History:  Recent hospitalizations/HC visits 10/11/21 PCP for suspected UTI, found to have kidney stone  1/28/21 [facility] ED for acute LLE DVT: diagnosed ~1 week after left foot surgery; was off anticoagulation for ~1-2 years at the time; started on Xarelto   Recent medication changes 4/27/22: Augmentin for root canal  2/16/22: was on Z-surendra for COVID-19  1/19/22: started metformin prn weight loss   10/12-10/18/21: Bactrim x7 days     Medications taken regularly that may interact with warfarin or alter INR CoQ10, Fish Oil   Warfarin dose taken as prescribed Uses pillbox - reports some difficulty remembering PM meds, but takes warfarin in AM   Signs/symptoms of bleeding None reported  2/16/22: had hematoma on forearm a few weeks ago, has resolved    Vitamin K intake Normally has ~3 servings of green, leafy vegetables per week  7/26/21: 1-3 servings/week   11/5/21: 1-2 servings    Recent vomiting/diarrhea/fever, changes in weight or activity level None reported   Tobacco or alcohol use Patient denies smoking  Patient reports having 2 drinks per week on average. Drinks socially. 10/18/21: had 3 beers and bourbon last night   11/5/21: 2 glasses of bourbon and a glass of wine last night    Upcoming surgeries or procedures None reported     Assessment/Plan:  Patient's INR was therapeutic today (2.6). Patient denies any missed/extra doses, diet changes, illness, or bleeding since last visit. Patient was instructed to continue warfarin 7.5 mg daily, except 5 mg on TuThSa. Repeat INR in 6 weeks. Patient was reminded to maintain consistent vitamin K intake and call with any bleeding, medication changes, or fever/vomiting/diarrhea. Patient understands dosing directions and information discussed. Dosing schedule and follow up appointment given to patient. Progress note routed to referring physician's office.  Patient acknowledges working in consult agreement with pharmacist as referred by his/her physician. Next Clinic Appointment:  6/8    Please call [facility] at [phone] with any questions. Thanks!   Parker McdanielD  [place]: [address] [place] [address] Street: [phone]  4/27/2022 4:24 PM     For Pharmacy Admin Tracking Only   Total # of Interventions Recommended: 0   Total # of Interventions Accepted: 0   Time Spent (min): 15

## 2022-06-08 ENCOUNTER — ANTI-COAG VISIT (OUTPATIENT)
Dept: PHARMACY | Age: 62
End: 2022-06-08
Payer: COMMERCIAL

## 2022-06-08 DIAGNOSIS — I82.4Y9 DEEP VEIN THROMBOSIS (DVT) OF PROXIMAL LOWER EXTREMITY, UNSPECIFIED CHRONICITY, UNSPECIFIED LATERALITY (HCC): Primary | ICD-10-CM

## 2022-06-08 LAB — INTERNATIONAL NORMALIZATION RATIO, POC: 2.1

## 2022-06-08 PROCEDURE — 85610 PROTHROMBIN TIME: CPT

## 2022-06-08 PROCEDURE — 99212 OFFICE O/P EST SF 10 MIN: CPT

## 2022-06-08 NOTE — PROGRESS NOTES
ANTICOAGULATION SERVICE    Burgess Berger is a 65 y.o. male with PMHx significant for history of DVT (Feb 2011, Oct 2011, Dec 2012, Jan 2021 - surgically provoked), borderline protein S deficiency who presents to clinic 6/8/2022 for anticoagulation monitoring and adjustment. Patient has tried Xarelto, Eliquis, Pradaxa, and Savaysa in the past, but stopped due to GI distress.      Anticoagulation Indication(s):  DVT (acute, recurrent)  Referring Physician:  Dr. Lopez Falling  Goal INR Range:  2-3  Duration of Anticoagulation Therapy:  Indefinite   Time of day dose taken:  AM  Product patient has at home:  warfarin 5 mg (peach)    INR Summary                           Warfarin regimen (mg)  Date INR   A/P    Sun Mon Tue Wed Thu Fri Sat Mg/wk  6/8 2.1 At goal, no change  7.5 7.5 5 7.5 5 7.5 5 45   4/27 2.6 At goal, no change  7.5 7.5 5 7.5 5 7.5 5 45  3/16 2.2 At goal, no change  7.5 7.5 5 7.5 5 7.5 5 45   2/16 2.4 At goal, no change  7.5 7.5 5 7.5 5 7.5 5 45  1/19 2.8 At goal, no change  7.5 7.5 5 7.5 5 7.5 5 45  12/22 2.3 At goal, no change  7.5 7.5 5 7.5 5 7.5 5 45  11/24 2.6 At goal, no change  7.5 7.5 5 7.5 5 7.5 5 45  11/5 3.5 Above goal, hold x1  7.5 7.5 5 7.5 5 7.5 0/5 45  10/18 4.8 Above goal (Bactrim)  7.5 7.5 0/5 5/7.5 5 7.5 5 45  9/20 2.7 At goal, no change  7.5 7.5 5 7.5 5 7.5 5 45  8/23 2.8 At goal, no change  7.5 7.5 5 7.5 5 7.5 5 45  7/26     2.4  At goal, no change  7.5 7.5 5 7.5 5 7.5 5 45  6/28 2.6 At goal, no change  7.5 7.5 5 7.5 5 7.5 5 45  6/7 3.0 At goal, no change  7.5 7.5 5 7.5 5 7.5 5 45  5/24 2.7 At goal, no change  7.5 7.5 5 7.5 5 7.5 5 45  5/19 2.6 At goal, decrease  7.5 7.5 5 7.5 5 7.5 5 45  5/14 1.6 Below goal, bolus+increase 7.5 7.5 5 7.5 5 10/7.5 7.5 47.5   5/10 1.7 Below goal, increase  5 7.5 5 5 5 7.5 5 40   5/6 1.2 Below goal, continue  5 5 5 5 5 5 5 35    Last CBC:  Lab Results   Component Value Date    RBC 4.25 (L) 07/08/2017    HGB 13.7 07/08/2017    HCT 41.5 07/08/2017    MCV 97.7 07/08/2017    MCH 32.3 07/08/2017    MPV 8.3 07/08/2017    RDW 14.0 07/08/2017     07/08/2017       Patient History:  Recent hospitalizations/HC visits 10/11/21 PCP for suspected UTI, found to have kidney stone  1/28/21 [facility] ED for acute LLE DVT: diagnosed ~1 week after left foot surgery; was off anticoagulation for ~1-2 years at the time; started on Xarelto   Recent medication changes 4/27/22: Augmentin for root canal  2/16/22: was on Z-surendra for COVID-19  1/19/22: started metformin prn weight loss   10/12-10/18/21: Bactrim x7 days     Medications taken regularly that may interact with warfarin or alter INR CoQ10, Fish Oil   Warfarin dose taken as prescribed Uses pillbox - reports some difficulty remembering PM meds, but takes warfarin in AM  6/8/22: may have missed one dose last week    Signs/symptoms of bleeding None reported  2/16/22: had hematoma on forearm a few weeks ago, has resolved    Vitamin K intake Normally has ~3 servings of green, leafy vegetables per week  7/26/21: 1-3 servings/week   11/5/21: 1-2 servings    Recent vomiting/diarrhea/fever, changes in weight or activity level None reported   Tobacco or alcohol use Patient denies smoking  Patient reports having 2 drinks per week on average. Drinks socially. 10/18/21: had 3 beers and bourbon last night   11/5/21: 2 glasses of bourbon and a glass of wine last night    Upcoming surgeries or procedures None reported     Assessment/Plan:  Patient's INR was therapeutic today (2.1). Patient denies diet changes, illness, or bleeding since last visit. May have missed one dose since last visit. Warfarin refill called into [facility] pharmacy per patient request.     Patient was instructed to continue warfarin 7.5 mg daily, except 5 mg on TuThSa. Repeat INR in 6 weeks. Patient was reminded to maintain consistent vitamin K intake and call with any bleeding, medication changes, or fever/vomiting/diarrhea.     Patient understands dosing directions and information discussed. Dosing schedule and follow up appointment given to patient. Progress note routed to referring physician's office. Patient acknowledges working in consult agreement with pharmacist as referred by his/her physician. Next Clinic Appointment:  7/20    Please call [facility] at [phone] with any questions. Thanks!   Stefano Louis, PharmD  [place]: [address] [place] [address] Street: [phone]  6/8/2022 4:30 PM     For Pharmacy Admin Tracking Only     Intervention Detail: Refill(s) Provided   Total # of Interventions Recommended: 1   Total # of Interventions Accepted: 1   Time Spent (min): 15

## 2022-07-20 ENCOUNTER — ANTI-COAG VISIT (OUTPATIENT)
Dept: PHARMACY | Age: 62
End: 2022-07-20
Payer: COMMERCIAL

## 2022-07-20 DIAGNOSIS — I82.4Y9 DEEP VEIN THROMBOSIS (DVT) OF PROXIMAL LOWER EXTREMITY, UNSPECIFIED CHRONICITY, UNSPECIFIED LATERALITY (HCC): Primary | ICD-10-CM

## 2022-07-20 LAB — INTERNATIONAL NORMALIZATION RATIO, POC: 1.7

## 2022-07-20 PROCEDURE — 99212 OFFICE O/P EST SF 10 MIN: CPT | Performed by: PHARMACIST

## 2022-07-20 PROCEDURE — 85610 PROTHROMBIN TIME: CPT | Performed by: PHARMACIST

## 2022-07-20 NOTE — PROGRESS NOTES
ANTICOAGULATION SERVICE    Marissa Schrader is a 65 y.o. male with PMHx significant for history of DVT (Feb 2011, Oct 2011, Dec 2012, Jan 2021 - surgically provoked), borderline protein S deficiency who presents to clinic 7/20/2022 for anticoagulation monitoring and adjustment. Patient has tried Xarelto, Eliquis, Pradaxa, and Savaysa in the past, but stopped due to GI distress.      Anticoagulation Indication(s):  DVT (acute, recurrent)  Referring Physician:  Dr. Olga Roberts  Goal INR Range:  2-3  Duration of Anticoagulation Therapy:  Indefinite   Time of day dose taken:  AM  Product patient has at home:  warfarin 5 mg (peach)    INR Summary                           Warfarin regimen (mg)  Date INR   A/P    Sun Mon Tue Wed Thu Fri Sat Mg/wk  7/20 1.7 Below goal, bolus  7.5 7.5 5 10/7.5 5 7.5 5 45    6/8 2.1 At goal, no change  7.5 7.5 5 7.5 5 7.5 5 45   4/27 2.6 At goal, no change  7.5 7.5 5 7.5 5 7.5 5 45  3/16 2.2 At goal, no change  7.5 7.5 5 7.5 5 7.5 5 45   2/16 2.4 At goal, no change  7.5 7.5 5 7.5 5 7.5 5 45  1/19 2.8 At goal, no change  7.5 7.5 5 7.5 5 7.5 5 45  12/22 2.3 At goal, no change  7.5 7.5 5 7.5 5 7.5 5 45  11/24 2.6 At goal, no change  7.5 7.5 5 7.5 5 7.5 5 45  11/5 3.5 Above goal, hold x1  7.5 7.5 5 7.5 5 7.5 0/5 45  10/18 4.8 Above goal (Bactrim)  7.5 7.5 0/5 5/7.5 5 7.5 5 45  9/20 2.7 At goal, no change  7.5 7.5 5 7.5 5 7.5 5 45  8/23 2.8 At goal, no change  7.5 7.5 5 7.5 5 7.5 5 45  7/26     2.4  At goal, no change  7.5 7.5 5 7.5 5 7.5 5 45  6/28 2.6 At goal, no change  7.5 7.5 5 7.5 5 7.5 5 45  6/7 3.0 At goal, no change  7.5 7.5 5 7.5 5 7.5 5 45  5/24 2.7 At goal, no change  7.5 7.5 5 7.5 5 7.5 5 45  5/19 2.6 At goal, decrease  7.5 7.5 5 7.5 5 7.5 5 45  5/14 1.6 Below goal, bolus+increase 7.5 7.5 5 7.5 5 10/7.5 7.5 47.5   5/10 1.7 Below goal, increase  5 7.5 5 5 5 7.5 5 40   5/6 1.2 Below goal, continue  5 5 5 5 5 5 5 35    Last CBC:  Lab Results   Component Value Date    RBC 4.25 (L) 07/08/2017    HGB 13.7 07/08/2017    HCT 41.5 07/08/2017    MCV 97.7 07/08/2017    MCH 32.3 07/08/2017    MPV 8.3 07/08/2017    RDW 14.0 07/08/2017     07/08/2017       Patient History:  Recent hospitalizations/HC visits 10/11/21 PCP for suspected UTI, found to have kidney stone  1/28/21 [facility] ED for acute LLE DVT: diagnosed ~1 week after left foot surgery; was off anticoagulation for ~1-2 years at the time; started on Xarelto   Recent medication changes 4/27/22: Augmentin for root canal  2/16/22: was on Z-surendra for COVID-19  1/19/22: started metformin prn weight loss   10/12-10/18/21: Bactrim x7 days     Medications taken regularly that may interact with warfarin or alter INR CoQ10, Fish Oil   Warfarin dose taken as prescribed Uses pillbox - reports some difficulty remembering PM meds, but takes warfarin in AM   Signs/symptoms of bleeding None reported  2/16/22: had hematoma on forearm a few weeks ago, has resolved    Vitamin K intake Normally has ~3 servings of green, leafy vegetables per week  7/26/21: 1-3 servings/week   11/5/21: 1-2 servings    Recent vomiting/diarrhea/fever, changes in weight or activity level None reported   Tobacco or alcohol use Patient denies smoking  Patient reports having 2 drinks per week on average. Drinks socially. 10/18/21: had 3 beers and bourbon last night   11/5/21: 2 glasses of bourbon and a glass of wine last night    Upcoming surgeries or procedures None reported     Assessment/Plan:  Patient's INR was subtherapeutic today (1.7). Patient reports it may be possible he missed a dose in the past two weeks but he is unsure. Patient denies diet changes, illness, or bleeding since last visit. Patient was instructed to take a bolus dose of 10 mg today then continue warfarin 7.5 mg daily, except 5 mg on TuThSa. Repeat INR in 3 weeks. He usually gets INR checked every 6 weeks.  Patient was reminded to maintain consistent vitamin K intake and call with any bleeding, medication

## 2022-08-03 ENCOUNTER — TELEPHONE (OUTPATIENT)
Dept: PHARMACY | Age: 62
End: 2022-08-03

## 2022-08-03 NOTE — TELEPHONE ENCOUNTER
Patient called to r/s INR appt on 8/10 as he will be out of town. He also notified me that he was started on Augmentin x7 days yesterday (8/2-8/8) for tooth infection. R/s for INR check for Monday 8/8 instead, so we can check INR prior to him leaving for trip given current abx use, which may increase INR.      Saida Sotomayor, PharmD, BCPS  [facility]  [place]: [phone]  [place]: [phone]  8/3/2022 10:52 AM

## 2022-08-08 ENCOUNTER — ANTI-COAG VISIT (OUTPATIENT)
Dept: PHARMACY | Age: 62
End: 2022-08-08
Payer: COMMERCIAL

## 2022-08-08 DIAGNOSIS — I82.4Y9 DEEP VEIN THROMBOSIS (DVT) OF PROXIMAL LOWER EXTREMITY, UNSPECIFIED CHRONICITY, UNSPECIFIED LATERALITY (HCC): Primary | ICD-10-CM

## 2022-08-08 LAB — INTERNATIONAL NORMALIZATION RATIO, POC: 3.2

## 2022-08-08 PROCEDURE — 85610 PROTHROMBIN TIME: CPT

## 2022-08-08 PROCEDURE — 99211 OFF/OP EST MAY X REQ PHY/QHP: CPT

## 2022-08-08 NOTE — PROGRESS NOTES
ANTICOAGULATION SERVICE    Kristin Huynh is a 65 y.o. male with PMHx significant for history of DVT (Feb 2011, Oct 2011, Dec 2012, Jan 2021 - surgically provoked), borderline protein S deficiency who presents to clinic 8/8/2022 for anticoagulation monitoring and adjustment. Patient has tried Xarelto, Eliquis, Pradaxa, and Savaysa in the past, but stopped due to GI distress.      Anticoagulation Indication(s):  DVT (acute, recurrent)  Referring Physician:  Dr. Shauna Rodriguez  Goal INR Range:  2-3  Duration of Anticoagulation Therapy:  Indefinite   Time of day dose taken:  AM  Product patient has at home:  warfarin 5 mg (peach)    INR Summary                           Warfarin regimen (mg)  Date INR   A/P    Sun Mon Tue Wed Thu Fri Sat Mg/wk  8/8 3.2 Above goal (ABX), continue 7.5 7.5 5 7.5 5 7.5 5 45  7/20 1.7 Below goal, bolus  7.5 7.5 5 10/7.5 5 7.5 5 45    6/8 2.1 At goal, no change  7.5 7.5 5 7.5 5 7.5 5 45   4/27 2.6 At goal, no change  7.5 7.5 5 7.5 5 7.5 5 45  3/16 2.2 At goal, no change  7.5 7.5 5 7.5 5 7.5 5 45   2/16 2.4 At goal, no change  7.5 7.5 5 7.5 5 7.5 5 45  1/19 2.8 At goal, no change  7.5 7.5 5 7.5 5 7.5 5 45  12/22 2.3 At goal, no change  7.5 7.5 5 7.5 5 7.5 5 45  11/24 2.6 At goal, no change  7.5 7.5 5 7.5 5 7.5 5 45  11/5 3.5 Above goal, hold x1  7.5 7.5 5 7.5 5 7.5 0/5 45  10/18 4.8 Above goal (Bactrim)  7.5 7.5 0/5 5/7.5 5 7.5 5 45  9/20 2.7 At goal, no change  7.5 7.5 5 7.5 5 7.5 5 45  8/23 2.8 At goal, no change  7.5 7.5 5 7.5 5 7.5 5 45  7/26     2.4  At goal, no change  7.5 7.5 5 7.5 5 7.5 5 45  6/28 2.6 At goal, no change  7.5 7.5 5 7.5 5 7.5 5 45  6/7 3.0 At goal, no change  7.5 7.5 5 7.5 5 7.5 5 45  5/24 2.7 At goal, no change  7.5 7.5 5 7.5 5 7.5 5 45  5/19 2.6 At goal, decrease  7.5 7.5 5 7.5 5 7.5 5 45  5/14 1.6 Below goal, bolus+increase 7.5 7.5 5 7.5 5 10/7.5 7.5 47.5   5/10 1.7 Below goal, increase  5 7.5 5 5 5 7.5 5 40   5/6 1.2 Below goal, continue  5 5 5 5 5 5 5 35    Last CBC:  Lab Results   Component Value Date    RBC 4.25 (L) 07/08/2017    HGB 13.7 07/08/2017    HCT 41.5 07/08/2017    MCV 97.7 07/08/2017    MCH 32.3 07/08/2017    MPV 8.3 07/08/2017    RDW 14.0 07/08/2017     07/08/2017       Patient History:  Recent hospitalizations/HC visits 10/11/21 PCP for suspected UTI, found to have kidney stone  1/28/21 [facility] ED for acute LLE DVT: diagnosed ~1 week after left foot surgery; was off anticoagulation for ~1-2 years at the time; started on Xarelto   Recent medication changes 8/2 Augmentin x 7 days for tooth infection  4/27/22: Augmentin for root canal  2/16/22: was on Z-surendra for COVID-19  1/19/22: started metformin prn weight loss    Medications taken regularly that may interact with warfarin or alter INR CoQ10, Fish Oil   Warfarin dose taken as prescribed Uses pillbox - reports some difficulty remembering PM meds, but takes warfarin in AM   Signs/symptoms of bleeding None reported  2/16/22: had hematoma on forearm a few weeks ago, has resolved    Vitamin K intake Normally has ~3 servings of green, leafy vegetables per week  7/26/21: 1-3 servings/week   11/5/21: 1-2 servings    Recent vomiting/diarrhea/fever, changes in weight or activity level None reported   Tobacco or alcohol use Patient denies smoking  Patient reports having 2 drinks per week on average. Drinks socially. 10/18/21: had 3 beers and bourbon last night   11/5/21: 2 glasses of bourbon and a glass of wine last night    Upcoming surgeries or procedures None reported     Assessment/Plan:  Patient's INR was supratherapeutic today (3.2), which can be expected as he has been taking Augmentin for the past week (increases INR). Patient denies any warfarin dosing errors, diet changes, illness, or bleeding since last visit. As patient will complete antibiotic course today, he was instructed to continue stable warfarin dose of 7.5 mg daily, except 5 mg on TuThSa. Repeat INR in 4 weeks.  He usually gets INR checked every 6 weeks. Patient was reminded to maintain consistent vitamin K intake and call with any bleeding, medication changes, or fever/vomiting/diarrhea. Patient understands dosing directions and information discussed. Dosing schedule and follow up appointment given to patient. Progress note routed to referring physician's office. Patient acknowledges working in consult agreement with pharmacist as referred by his/her physician. Next Clinic Appointment:      Please call [facility] at [phone] with any questions. Thanks! Camilla Merlos.  Rosey CanalesD, [facility]  [facility]  Ph: [phone]  2022 5:46 PM    For Pharmacy Admin Tracking Only    Intervention Detail: Adherence Monitorin  Total # of Interventions Recommended: 0  Total # of Interventions Accepted: 0  Time Spent (min): 15

## 2022-09-07 ENCOUNTER — ANTI-COAG VISIT (OUTPATIENT)
Dept: PHARMACY | Age: 62
End: 2022-09-07
Payer: COMMERCIAL

## 2022-09-07 DIAGNOSIS — I82.4Y9 DEEP VEIN THROMBOSIS (DVT) OF PROXIMAL LOWER EXTREMITY, UNSPECIFIED CHRONICITY, UNSPECIFIED LATERALITY (HCC): Primary | ICD-10-CM

## 2022-09-07 LAB — INTERNATIONAL NORMALIZATION RATIO, POC: 2.8

## 2022-09-07 PROCEDURE — 99211 OFF/OP EST MAY X REQ PHY/QHP: CPT

## 2022-09-07 PROCEDURE — 85610 PROTHROMBIN TIME: CPT

## 2022-09-07 NOTE — PROGRESS NOTES
ANTICOAGULATION SERVICE    Kristin Huynh is a 65 y.o. male with PMHx significant for history of DVT (Feb 2011, Oct 2011, Dec 2012, Jan 2021 - surgically provoked), borderline protein S deficiency who presents to clinic 9/7/2022 for anticoagulation monitoring and adjustment. Patient has tried Xarelto, Eliquis, Pradaxa, and Savaysa in the past, but stopped due to GI distress.      Anticoagulation Indication(s):  DVT (acute, recurrent)  Referring Physician:  Dr. Shauna Rodriguez  Goal INR Range:  2-3  Duration of Anticoagulation Therapy:  Indefinite   Time of day dose taken:  AM  Product patient has at home:  warfarin 5 mg (peach)    INR Summary                           Warfarin regimen (mg)  Date INR   A/P    Sun Mon Tue Wed Thu Fri Sat Mg/wk  9/7 2.8 At goal, no change  7.5 7.5 5 7.5 5 7.5 5 45  8/8 3.2 Above goal (ABX), continue 7.5 7.5 5 7.5 5 7.5 5 45  7/20 1.7 Below goal, bolus  7.5 7.5 5 10/7.5 5 7.5 5 45    6/8 2.1 At goal, no change  7.5 7.5 5 7.5 5 7.5 5 45   4/27 2.6 At goal, no change  7.5 7.5 5 7.5 5 7.5 5 45  3/16 2.2 At goal, no change  7.5 7.5 5 7.5 5 7.5 5 45   2/16 2.4 At goal, no change  7.5 7.5 5 7.5 5 7.5 5 45  1/19 2.8 At goal, no change  7.5 7.5 5 7.5 5 7.5 5 45  12/22 2.3 At goal, no change  7.5 7.5 5 7.5 5 7.5 5 45  11/24 2.6 At goal, no change  7.5 7.5 5 7.5 5 7.5 5 45  11/5 3.5 Above goal, hold x1  7.5 7.5 5 7.5 5 7.5 0/5 45  10/18 4.8 Above goal (Bactrim)  7.5 7.5 0/5 5/7.5 5 7.5 5 45  9/20 2.7 At goal, no change  7.5 7.5 5 7.5 5 7.5 5 45  8/23 2.8 At goal, no change  7.5 7.5 5 7.5 5 7.5 5 45  7/26     2.4  At goal, no change  7.5 7.5 5 7.5 5 7.5 5 45  6/28 2.6 At goal, no change  7.5 7.5 5 7.5 5 7.5 5 45  6/7 3.0 At goal, no change  7.5 7.5 5 7.5 5 7.5 5 45  5/24 2.7 At goal, no change  7.5 7.5 5 7.5 5 7.5 5 45  5/19 2.6 At goal, decrease  7.5 7.5 5 7.5 5 7.5 5 45  5/14 1.6 Below goal, bolus+increase 7.5 7.5 5 7.5 5 10/7.5 7.5 47.5   5/10 1.7 Below goal, increase  5 7.5 5 5 5 7.5 5 40 5/6 1.2 Below goal, continue  5 5 5 5 5 5 5 35    Last CBC:  Lab Results   Component Value Date    RBC 4.25 (L) 07/08/2017    HGB 13.7 07/08/2017    HCT 41.5 07/08/2017    MCV 97.7 07/08/2017    MCH 32.3 07/08/2017    MPV 8.3 07/08/2017    RDW 14.0 07/08/2017     07/08/2017       Patient History:  Recent hospitalizations/HC visits 10/11/21 PCP for suspected UTI, found to have kidney stone  1/28/21 [facility] ED for acute LLE DVT: diagnosed ~1 week after left foot surgery; was off anticoagulation for ~1-2 years at the time; started on Xarelto   Recent medication changes 8/2 Augmentin x 7 days for tooth infection  4/27/22: Augmentin for root canal  2/16/22: was on Z-surendra for COVID-19  1/19/22: started metformin prn weight loss    Medications taken regularly that may interact with warfarin or alter INR CoQ10, Fish Oil   Warfarin dose taken as prescribed Uses pillbox - reports some difficulty remembering PM meds, but takes warfarin in AM   Signs/symptoms of bleeding None reported  2/16/22: had hematoma on forearm a few weeks ago, has resolved    Vitamin K intake Normally has ~3 servings of green, leafy vegetables per week  7/26/21: 1-3 servings/week   11/5/21: 1-2 servings    Recent vomiting/diarrhea/fever, changes in weight or activity level None reported   Tobacco or alcohol use Patient denies smoking  Patient reports having 2 drinks per week on average. Drinks socially. 10/18/21: had 3 beers and bourbon last night   11/5/21: 2 glasses of bourbon and a glass of wine last night    Upcoming surgeries or procedures None reported     Assessment/Plan:  Patient's INR was therapeutic today (2.8). Patient denies any warfarin dosing errors, diet changes, or med changes since last visit. He does endorse that he had bronchitis a few weeks ago and now has started to cough up some red/brown colored phlegm. It only happens in the morning, but it is of concern.  Patient has already made an appointment with his doctor tomorrow to address this issue. Instructed patient to call clinic if started on any new medications, especially antibiotics or steroids. Patient was instructed to continue stable warfarin dose of 7.5 mg daily, except 5 mg on TuThSa. Repeat INR in 4 weeks. He usually gets INR checked every 6 weeks. Patient was reminded to maintain consistent vitamin K intake and call with any bleeding, medication changes, or fever/vomiting/diarrhea. Patient understands dosing directions and information discussed. Dosing schedule and follow up appointment given to patient. Progress note routed to referring physician's office. Patient acknowledges working in consult agreement with pharmacist as referred by his/her physician. Next Clinic Appointment:  10/5    Please call [facility] at [phone] with any questions. Thanks! Marbella Jesus.  Omkar Prater PharmD, [facility]  [facility]  Ph: [phone]  2022 4:28 PM    For Pharmacy Admin Tracking Only    Intervention Detail: Adherence Monitorin  Total # of Interventions Recommended: 0  Total # of Interventions Accepted: 0  Time Spent (min): 15

## 2022-10-05 ENCOUNTER — ANTI-COAG VISIT (OUTPATIENT)
Dept: PHARMACY | Age: 62
End: 2022-10-05
Payer: COMMERCIAL

## 2022-10-05 DIAGNOSIS — I82.4Y9 DEEP VEIN THROMBOSIS (DVT) OF PROXIMAL LOWER EXTREMITY, UNSPECIFIED CHRONICITY, UNSPECIFIED LATERALITY (HCC): Primary | ICD-10-CM

## 2022-10-05 LAB — INTERNATIONAL NORMALIZATION RATIO, POC: 2.7

## 2022-10-05 PROCEDURE — 85610 PROTHROMBIN TIME: CPT | Performed by: PHARMACIST

## 2022-10-05 PROCEDURE — 99211 OFF/OP EST MAY X REQ PHY/QHP: CPT | Performed by: PHARMACIST

## 2022-10-05 NOTE — PROGRESS NOTES
ANTICOAGULATION SERVICE    Live Altamirano is a 65 y.o. male with PMHx significant for history of DVT (Feb 2011, Oct 2011, Dec 2012, Jan 2021 - surgically provoked), borderline protein S deficiency who presents to clinic 10/5/2022 for anticoagulation monitoring and adjustment. Patient has tried Xarelto, Eliquis, Pradaxa, and Savaysa in the past, but stopped due to GI distress.      Anticoagulation Indication(s):  DVT (acute, recurrent)  Referring Physician:  Dr. Liane Spears  Goal INR Range:  2-3  Duration of Anticoagulation Therapy:  Indefinite   Time of day dose taken:  AM  Product patient has at home:  warfarin 5 mg (peach)    INR Summary                           Warfarin regimen (mg)  Date INR   A/P    Sun Mon Tue Wed Thu Fri Sat Mg/wk  10/5 2.7 At goal, no change  7.5 7.5 5 7.5 5 7.5 5 45  9/7 2.8 At goal, no change  7.5 7.5 5 7.5 5 7.5 5 45  8/8 3.2 Above goal (ABX), continue 7.5 7.5 5 7.5 5 7.5 5 45  7/20 1.7 Below goal, bolus  7.5 7.5 5 10/7.5 5 7.5 5 45    6/8 2.1 At goal, no change  7.5 7.5 5 7.5 5 7.5 5 45   4/27 2.6 At goal, no change  7.5 7.5 5 7.5 5 7.5 5 45  3/16 2.2 At goal, no change  7.5 7.5 5 7.5 5 7.5 5 45   2/16 2.4 At goal, no change  7.5 7.5 5 7.5 5 7.5 5 45  1/19 2.8 At goal, no change  7.5 7.5 5 7.5 5 7.5 5 45  12/22 2.3 At goal, no change  7.5 7.5 5 7.5 5 7.5 5 45  11/24 2.6 At goal, no change  7.5 7.5 5 7.5 5 7.5 5 45  11/5 3.5 Above goal, hold x1  7.5 7.5 5 7.5 5 7.5 0/5 45  10/18 4.8 Above goal (Bactrim)  7.5 7.5 0/5 5/7.5 5 7.5 5 45  9/20 2.7 At goal, no change  7.5 7.5 5 7.5 5 7.5 5 45  8/23 2.8 At goal, no change  7.5 7.5 5 7.5 5 7.5 5 45  7/26     2.4  At goal, no change  7.5 7.5 5 7.5 5 7.5 5 45  6/28 2.6 At goal, no change  7.5 7.5 5 7.5 5 7.5 5 45  6/7 3.0 At goal, no change  7.5 7.5 5 7.5 5 7.5 5 45  5/24 2.7 At goal, no change  7.5 7.5 5 7.5 5 7.5 5 45  5/19 2.6 At goal, decrease  7.5 7.5 5 7.5 5 7.5 5 45  5/14 1.6 Below goal, bolus+increase 7.5 7.5 5 7.5 5 10/7.5 7.5 47.5 5/10 1.7 Below goal, increase  5 7.5 5 5 5 7.5 5 40   5/6 1.2 Below goal, continue  5 5 5 5 5 5 5 35    Last CBC:  Lab Results   Component Value Date    RBC 4.25 (L) 07/08/2017    HGB 13.7 07/08/2017    HCT 41.5 07/08/2017    MCV 97.7 07/08/2017    MCH 32.3 07/08/2017    MPV 8.3 07/08/2017    RDW 14.0 07/08/2017     07/08/2017       Patient History:  Recent hospitalizations/HC visits 10/11/21 PCP for suspected UTI, found to have kidney stone  1/28/21 [facility] ED for acute LLE DVT: diagnosed ~1 week after left foot surgery; was off anticoagulation for ~1-2 years at the time; started on Xarelto   Recent medication changes 8/2 Augmentin x 7 days for tooth infection  4/27/22: Augmentin for root canal  2/16/22: was on Z-surendra for COVID-19  1/19/22: started metformin prn weight loss    Medications taken regularly that may interact with warfarin or alter INR CoQ10, Fish Oil   Warfarin dose taken as prescribed Uses pillbox - reports some difficulty remembering PM meds, but takes warfarin in AM   Signs/symptoms of bleeding None reported  2/16/22: had hematoma on forearm a few weeks ago, has resolved    Vitamin K intake Normally has ~3 servings of green, leafy vegetables per week  7/26/21: 1-3 servings/week   11/5/21: 1-2 servings    Recent vomiting/diarrhea/fever, changes in weight or activity level None reported   Tobacco or alcohol use Patient denies smoking  Patient reports having 2 drinks per week on average. Drinks socially. 10/18/21: had 3 beers and bourbon last night   11/5/21: 2 glasses of bourbon and a glass of wine last night    Upcoming surgeries or procedures Possible phlebectomy     Assessment/Plan:  Patient's INR was therapeutic today (2.7). Patient denies any warfarin dosing errors, diet changes, or med changes since last visit.      Patient recently saw a vascular surgeon at [facility] who recommended possible phlebectomy:   \"Since he has failed conservative therapy over the past several months, he would benefit from RLE phlebectomy. Patient taking life-long coumadin due to history of DVT. He would likely require Lovenox bridging if he wishes to proceed with surgery. He will discuss with family and call back with decision. \"     Patient was instructed to continue stable warfarin dose of 7.5 mg daily, except 5 mg on TuThSa. Repeat INR in 6 weeks. Patient was reminded to maintain consistent vitamin K intake and call with any bleeding, medication changes, or fever/vomiting/diarrhea. Patient understands dosing directions and information discussed. Dosing schedule and follow up appointment given to patient. Progress note routed to referring physician's office. Patient acknowledges working in consult agreement with pharmacist as referred by his/her physician. Next Clinic Appointment:  11/16    Please call [facility] at [phone] with any questions. Thanks!   Eron Louis, PharmD, [facility]  [facility]  [place]: [phone]  [place]: [phone]  10/5/2022 5:00 PM      For Pharmacy Admin Tracking Only  Total # of Interventions Recommended: 0  Total # of Interventions Accepted: 0  Time Spent (min): 15

## 2022-11-21 ENCOUNTER — ANTI-COAG VISIT (OUTPATIENT)
Dept: PHARMACY | Age: 62
End: 2022-11-21
Payer: COMMERCIAL

## 2022-11-21 DIAGNOSIS — I82.4Y9 DEEP VEIN THROMBOSIS (DVT) OF PROXIMAL LOWER EXTREMITY, UNSPECIFIED CHRONICITY, UNSPECIFIED LATERALITY (HCC): Primary | ICD-10-CM

## 2022-11-21 LAB — INTERNATIONAL NORMALIZATION RATIO, POC: 2.3

## 2022-11-21 PROCEDURE — 99211 OFF/OP EST MAY X REQ PHY/QHP: CPT | Performed by: PHARMACIST

## 2022-11-21 PROCEDURE — 85610 PROTHROMBIN TIME: CPT | Performed by: PHARMACIST

## 2022-11-21 NOTE — PROGRESS NOTES
ANTICOAGULATION SERVICE    Jewels Mills is a 65 y.o. male with PMHx significant for history of DVT (Feb 2011, Oct 2011, Dec 2012, Jan 2021 - surgically provoked), borderline protein S deficiency who presents to clinic 11/21/2022 for anticoagulation monitoring and adjustment. Patient has tried Xarelto, Eliquis, Pradaxa, and Savaysa in the past, but stopped due to GI distress.      Anticoagulation Indication(s):  DVT (acute, recurrent)  Referring Physician:  Dr. Nathaly gA  Goal INR Range:  2-3  Duration of Anticoagulation Therapy:  Indefinite   Time of day dose taken:  AM  Product patient has at home:  warfarin 5 mg (peach)    INR Summary                           Warfarin regimen (mg)  Date INR   A/P    Sun Mon Tue Wed Thu Fri Sat Mg/wk  11/21 2.3 At goal, no change  7.5 7.5 5 7.5 5 7.5 5 45  10/5 2.7 At goal, no change  7.5 7.5 5 7.5 5 7.5 5 45  9/7 2.8 At goal, no change  7.5 7.5 5 7.5 5 7.5 5 45  8/8 3.2 Above goal (ABX), continue 7.5 7.5 5 7.5 5 7.5 5 45  7/20 1.7 Below goal, bolus  7.5 7.5 5 10/7.5 5 7.5 5 45    6/8 2.1 At goal, no change  7.5 7.5 5 7.5 5 7.5 5 45   4/27 2.6 At goal, no change  7.5 7.5 5 7.5 5 7.5 5 45  3/16 2.2 At goal, no change  7.5 7.5 5 7.5 5 7.5 5 45   2/16 2.4 At goal, no change  7.5 7.5 5 7.5 5 7.5 5 45  1/19 2.8 At goal, no change  7.5 7.5 5 7.5 5 7.5 5 45  12/22 2.3 At goal, no change  7.5 7.5 5 7.5 5 7.5 5 45  11/24 2.6 At goal, no change  7.5 7.5 5 7.5 5 7.5 5 45  11/5 3.5 Above goal, hold x1  7.5 7.5 5 7.5 5 7.5 0/5 45  10/18 4.8 Above goal (Bactrim)  7.5 7.5 0/5 5/7.5 5 7.5 5 45  9/20 2.7 At goal, no change  7.5 7.5 5 7.5 5 7.5 5 45  8/23 2.8 At goal, no change  7.5 7.5 5 7.5 5 7.5 5 45  7/26     2.4  At goal, no change  7.5 7.5 5 7.5 5 7.5 5 45  6/28 2.6 At goal, no change  7.5 7.5 5 7.5 5 7.5 5 45  6/7 3.0 At goal, no change  7.5 7.5 5 7.5 5 7.5 5 45  5/24 2.7 At goal, no change  7.5 7.5 5 7.5 5 7.5 5 45  5/19 2.6 At goal, decrease  7.5 7.5 5 7.5 5 7.5 5 45  5/14 1.6 Below goal, bolus+increase 7.5 7.5 5 7.5 5 10/7.5 7.5 47.5   5/10 1.7 Below goal, increase  5 7.5 5 5 5 7.5 5 40   5/6 1.2 Below goal, continue  5 5 5 5 5 5 5 35    Last CBC:  Lab Results   Component Value Date    RBC 4.25 (L) 07/08/2017    HGB 13.7 07/08/2017    HCT 41.5 07/08/2017    MCV 97.7 07/08/2017    MCH 32.3 07/08/2017    MPV 8.3 07/08/2017    RDW 14.0 07/08/2017     07/08/2017       Patient History:  Recent hospitalizations/HC visits 10/11/21 PCP for suspected UTI, found to have kidney stone  1/28/21 [facility] ED for acute LLE DVT: diagnosed ~1 week after left foot surgery; was off anticoagulation for ~1-2 years at the time; started on Xarelto   Recent medication changes 8/2 Augmentin x 7 days for tooth infection  4/27/22: Augmentin for root canal  2/16/22: was on Z-surendra for COVID-19  1/19/22: started metformin prn weight loss    Medications taken regularly that may interact with warfarin or alter INR CoQ10, Fish Oil   Warfarin dose taken as prescribed Uses pillbox - reports some difficulty remembering PM meds, but takes warfarin in AM   Signs/symptoms of bleeding None reported  2/16/22: had hematoma on forearm a few weeks ago, has resolved    Vitamin K intake Normally has ~3 servings of green, leafy vegetables per week  7/26/21: 1-3 servings/week   11/5/21: 1-2 servings    Recent vomiting/diarrhea/fever, changes in weight or activity level None reported   Tobacco or alcohol use Patient denies smoking  Patient reports having 2 drinks per week on average. Drinks socially. 10/18/21: had 3 beers and bourbon last night   11/5/21: 2 glasses of bourbon and a glass of wine last night    Upcoming surgeries or procedures None      Assessment/Plan:  Patient's INR was therapeutic today (2.3). Patient denies any warfarin dosing errors, diet changes, or med changes since last visit. Patient was instructed to continue stable warfarin dose of 7.5 mg daily, except 5 mg on TuThSa. Repeat INR in 7 weeks due to the holidays. Patient was reminded to maintain consistent vitamin K intake and call with any bleeding, medication changes, or fever/vomiting/diarrhea. Patient understands dosing directions and information discussed. Dosing schedule and follow up appointment given to patient. Progress note routed to referring physician's office. Patient acknowledges working in consult agreement with pharmacist as referred by his/her physician. Next Clinic Appointment:  1/9     Please call [facility] at [phone] with any questions. Thanks!   Aris Rehman, PharmD, BCPS  [facility]  [place]: [phone]  [facility]: [phone]  11/21/2022 4:34 PM      For Pharmacy Admin Tracking Only  Total # of Interventions Recommended: 0  Total # of Interventions Accepted: 0  Time Spent (min): 15

## 2023-01-09 ENCOUNTER — ANTI-COAG VISIT (OUTPATIENT)
Dept: PHARMACY | Age: 63
End: 2023-01-09
Payer: COMMERCIAL

## 2023-01-09 DIAGNOSIS — I82.4Y9 DEEP VEIN THROMBOSIS (DVT) OF PROXIMAL LOWER EXTREMITY, UNSPECIFIED CHRONICITY, UNSPECIFIED LATERALITY (HCC): Primary | ICD-10-CM

## 2023-01-09 LAB — INTERNATIONAL NORMALIZATION RATIO, POC: 2.3

## 2023-01-09 PROCEDURE — 85610 PROTHROMBIN TIME: CPT | Performed by: PHARMACIST

## 2023-01-09 PROCEDURE — 99211 OFF/OP EST MAY X REQ PHY/QHP: CPT | Performed by: PHARMACIST

## 2023-01-09 NOTE — PROGRESS NOTES
ANTICOAGULATION SERVICE    Pham Hutchins is a 65 y.o. male with PMHx significant for history of DVT (Feb 2011, Oct 2011, Dec 2012, Jan 2021 - surgically provoked), borderline protein S deficiency who presents to clinic 1/9/2023 for anticoagulation monitoring and adjustment. Patient has tried Xarelto, Eliquis, Pradaxa, and Savaysa in the past, but stopped due to GI distress.      Anticoagulation Indication(s):  DVT (acute, recurrent)  Referring Physician:  Dr. Dimas Canchola  Goal INR Range:  2-3  Duration of Anticoagulation Therapy:  Indefinite   Time of day dose taken:  AM  Product patient has at home:  warfarin 5 mg (peach)    INR Summary                           Warfarin regimen (mg)  Date INR   A/P    Sun Mon Tue Wed Thu Fri Sat Mg/wk  1/9 2.3 At goal, no change  7.5 7.5 5 7.5 5 7.5 5 45  11/21 2.3 At goal, no change  7.5 7.5 5 7.5 5 7.5 5 45  10/5 2.7 At goal, no change  7.5 7.5 5 7.5 5 7.5 5 45  9/7 2.8 At goal, no change  7.5 7.5 5 7.5 5 7.5 5 45  8/8 3.2 Above goal (ABX), continue 7.5 7.5 5 7.5 5 7.5 5 45  7/20 1.7 Below goal, bolus  7.5 7.5 5 10/7.5 5 7.5 5 45    6/8 2.1 At goal, no change  7.5 7.5 5 7.5 5 7.5 5 45   4/27 2.6 At goal, no change  7.5 7.5 5 7.5 5 7.5 5 45  3/16 2.2 At goal, no change  7.5 7.5 5 7.5 5 7.5 5 45   2/16 2.4 At goal, no change  7.5 7.5 5 7.5 5 7.5 5 45  1/19 2.8 At goal, no change  7.5 7.5 5 7.5 5 7.5 5 45  12/22 2.3 At goal, no change  7.5 7.5 5 7.5 5 7.5 5 45  11/24 2.6 At goal, no change  7.5 7.5 5 7.5 5 7.5 5 45  11/5 3.5 Above goal, hold x1  7.5 7.5 5 7.5 5 7.5 0/5 45  10/18 4.8 Above goal (Bactrim)  7.5 7.5 0/5 5/7.5 5 7.5 5 45  9/20 2.7 At goal, no change  7.5 7.5 5 7.5 5 7.5 5 45  8/23 2.8 At goal, no change  7.5 7.5 5 7.5 5 7.5 5 45  7/26     2.4  At goal, no change  7.5 7.5 5 7.5 5 7.5 5 45  6/28 2.6 At goal, no change  7.5 7.5 5 7.5 5 7.5 5 45  6/7 3.0 At goal, no change  7.5 7.5 5 7.5 5 7.5 5 45  5/24 2.7 At goal, no change  7.5 7.5 5 7.5 5 7.5 5 45  5/19 2.6 At goal, decrease  7.5 7.5 5 7.5 5 7.5 5 45  5/14 1.6 Below goal, bolus+increase 7.5 7.5 5 7.5 5 10/7.5 7.5 47.5   5/10 1.7 Below goal, increase  5 7.5 5 5 5 7.5 5 40   5/6 1.2 Below goal, continue  5 5 5 5 5 5 5 35    Last CBC:  Lab Results   Component Value Date    RBC 4.25 (L) 07/08/2017    HGB 13.7 07/08/2017    HCT 41.5 07/08/2017    MCV 97.7 07/08/2017    MCH 32.3 07/08/2017    MPV 8.3 07/08/2017    RDW 14.0 07/08/2017     07/08/2017       Patient History:  Recent hospitalizations/HC visits 10/11/21 PCP for suspected UTI, found to have kidney stone  1/28/21 [facility] ED for acute LLE DVT: diagnosed ~1 week after left foot surgery; was off anticoagulation for ~1-2 years at the time; started on Xarelto   Recent medication changes 8/2 Augmentin x 7 days for tooth infection  4/27/22: Augmentin for root canal  2/16/22: was on Z-surendra for COVID-19  1/19/22: started metformin prn weight loss    Medications taken regularly that may interact with warfarin or alter INR CoQ10, Fish Oil   Warfarin dose taken as prescribed Uses pillbox - reports some difficulty remembering PM meds, but takes warfarin in AM   Signs/symptoms of bleeding None reported  2/16/22: had hematoma on forearm a few weeks ago, has resolved    Vitamin K intake Normally has ~3 servings of green, leafy vegetables per week  7/26/21: 1-3 servings/week   11/5/21: 1-2 servings    Recent vomiting/diarrhea/fever, changes in weight or activity level None reported   Tobacco or alcohol use Patient denies smoking  Patient reports having 2 drinks per week on average. Drinks socially. 10/18/21: had 3 beers and bourbon last night   11/5/21: 2 glasses of bourbon and a glass of wine last night    Upcoming surgeries or procedures None      Assessment/Plan:  Patient's INR was therapeutic today (2.3). Patient denies any warfarin dosing errors, diet changes, or med changes since last visit.      Patient was instructed to continue stable warfarin dose of 7.5 mg daily, except 5 mg on [place]. Repeat INR in 6 weeks. Patient was reminded to maintain consistent vitamin K intake and call with any bleeding, medication changes, or fever/vomiting/diarrhea. Patient understands dosing directions and information discussed. Dosing schedule and follow up appointment given to patient. Progress note routed to referring physician's office. Patient acknowledges working in consult agreement with pharmacist as referred by his/her physician. Next Clinic Appointment:  2/20     Please call [facility] at [phone] with any questions. Thanks!   Blaise Villarreal, PharmD, BCPS  [facility]  [place]: [phone]  [place]: [phone]  1/9/2023 4:30 PM    For Pharmacy Admin Tracking Only  Total # of Interventions Recommended: 0  Total # of Interventions Accepted: 0  Time Spent (min): 15

## 2023-02-13 ENCOUNTER — ANTI-COAG VISIT (OUTPATIENT)
Dept: PHARMACY | Age: 63
End: 2023-02-13
Payer: COMMERCIAL

## 2023-02-13 DIAGNOSIS — I82.4Y9 DEEP VEIN THROMBOSIS (DVT) OF PROXIMAL LOWER EXTREMITY, UNSPECIFIED CHRONICITY, UNSPECIFIED LATERALITY (HCC): Primary | ICD-10-CM

## 2023-02-13 LAB — INTERNATIONAL NORMALIZATION RATIO, POC: 2.1

## 2023-02-13 PROCEDURE — 85610 PROTHROMBIN TIME: CPT | Performed by: PHARMACIST

## 2023-02-13 PROCEDURE — 99211 OFF/OP EST MAY X REQ PHY/QHP: CPT | Performed by: PHARMACIST

## 2023-02-13 NOTE — PROGRESS NOTES
ANTICOAGULATION SERVICE    Jeannette Martin is a 65 y.o. male with PMHx significant for history of DVT (Feb 2011, Oct 2011, Dec 2012, Jan 2021 - surgically provoked), borderline protein S deficiency who presents to clinic 2/13/2023 for anticoagulation monitoring and adjustment. Patient has tried Xarelto, Eliquis, Pradaxa, and Savaysa in the past, but stopped due to GI distress.      Anticoagulation Indication(s):  DVT (acute, recurrent)  Referring Physician:  Dr. Kati Morgan  Goal INR Range:  2-3  Duration of Anticoagulation Therapy:  Indefinite   Time of day dose taken:  AM  Product patient has at home:  warfarin 5 mg (peach)    INR Summary                           Warfarin regimen (mg)  Date INR   A/P    Sun Mon Tue Wed Thu Fri Sat Mg/wk  2/13 2.1 At goal, no change  7.5 7.5 5 7.5 5 7.5 5 45  1/9 2.3 At goal, no change  7.5 7.5 5 7.5 5 7.5 5 45  11/21 2.3 At goal, no change  7.5 7.5 5 7.5 5 7.5 5 45  10/5 2.7 At goal, no change  7.5 7.5 5 7.5 5 7.5 5 45  9/7 2.8 At goal, no change  7.5 7.5 5 7.5 5 7.5 5 45  8/8 3.2 Above goal (ABX), continue 7.5 7.5 5 7.5 5 7.5 5 45  7/20 1.7 Below goal, bolus  7.5 7.5 5 10/7.5 5 7.5 5 45    6/8 2.1 At goal, no change  7.5 7.5 5 7.5 5 7.5 5 45   4/27 2.6 At goal, no change  7.5 7.5 5 7.5 5 7.5 5 45  3/16 2.2 At goal, no change  7.5 7.5 5 7.5 5 7.5 5 45   2/16 2.4 At goal, no change  7.5 7.5 5 7.5 5 7.5 5 45  1/19 2.8 At goal, no change  7.5 7.5 5 7.5 5 7.5 5 45  12/22 2.3 At goal, no change  7.5 7.5 5 7.5 5 7.5 5 45  11/24 2.6 At goal, no change  7.5 7.5 5 7.5 5 7.5 5 45  11/5 3.5 Above goal, hold x1  7.5 7.5 5 7.5 5 7.5 0/5 45  10/18 4.8 Above goal (Bactrim)  7.5 7.5 0/5 5/7.5 5 7.5 5 45  9/20 2.7 At goal, no change  7.5 7.5 5 7.5 5 7.5 5 45  8/23 2.8 At goal, no change  7.5 7.5 5 7.5 5 7.5 5 45  7/26     2.4  At goal, no change  7.5 7.5 5 7.5 5 7.5 5 45  6/28 2.6 At goal, no change  7.5 7.5 5 7.5 5 7.5 5 45  6/7 3.0 At goal, no change  7.5 7.5 5 7.5 5 7.5 5 45  5/24 2.7 At goal, no change  7.5 7.5 5 7.5 5 7.5 5 45  5/19 2.6 At goal, decrease  7.5 7.5 5 7.5 5 7.5 5 45  5/14 1.6 Below goal, bolus+increase 7.5 7.5 5 7.5 5 10/7.5 7.5 47.5   5/10 1.7 Below goal, increase  5 7.5 5 5 5 7.5 5 40   5/6 1.2 Below goal, continue  5 5 5 5 5 5 5 35    Last CBC:  Lab Results   Component Value Date    RBC 4.25 (L) 07/08/2017    HGB 13.7 07/08/2017    HCT 41.5 07/08/2017    MCV 97.7 07/08/2017    MCH 32.3 07/08/2017    MPV 8.3 07/08/2017    RDW 14.0 07/08/2017     07/08/2017       Patient History:  Recent hospitalizations/HC visits 10/11/21 PCP for suspected UTI, found to have kidney stone  1/28/21 [facility] ED for acute LLE DVT: diagnosed ~1 week after left foot surgery; was off anticoagulation for ~1-2 years at the time; started on Xarelto   Recent medication changes 8/2/22: Augmentin x 7 days for tooth infection  4/27/22: Augmentin for root canal  2/16/22: was on Z-surendra for COVID-19  1/19/22: started metformin prn weight loss    Medications taken regularly that may interact with warfarin or alter INR CoQ10, Fish Oil   Warfarin dose taken as prescribed Uses pillbox - reports some difficulty remembering PM meds, but takes warfarin in AM   Signs/symptoms of bleeding None reported  2/16/22: had hematoma on forearm a few weeks ago, has resolved    Vitamin K intake Normally has ~3 servings of green, leafy vegetables per week    Recent vomiting/diarrhea/fever, changes in weight or activity level None reported   Tobacco or alcohol use Patient denies smoking  Patient reports having 2 drinks per week on average. Drinks socially. Upcoming surgeries or procedures None      Assessment/Plan:  Patient's INR was therapeutic today (2.1). Patient denies any warfarin dosing errors, diet changes, or med changes since last visit. Patient was instructed to continue stable warfarin dose of 7.5 mg daily, except 5 mg on TuThSa. Repeat INR in 6 weeks.  Patient was reminded to maintain consistent vitamin K intake and call with any bleeding, medication changes, or fever/vomiting/diarrhea. Patient understands dosing directions and information discussed. Dosing schedule and follow up appointment given to patient. Progress note routed to referring physician's office. Patient acknowledges working in consult agreement with pharmacist as referred by his/her physician. Next Clinic Appointment:  3/27    Please call [facility] Clinic at [phone] with any questions. Thanks!   Sabas Guzman, PharmD, BCPS  [facility] Medication Management [address] [place] [address]: [phone]  [facility]: [phone]  2/13/2023 4:42 PM    For Pharmacy Admin Tracking Only  Time Spent (min): 15

## 2023-03-27 ENCOUNTER — ANTI-COAG VISIT (OUTPATIENT)
Dept: PHARMACY | Age: 63
End: 2023-03-27
Payer: COMMERCIAL

## 2023-03-27 DIAGNOSIS — I82.4Y9 DEEP VEIN THROMBOSIS (DVT) OF PROXIMAL LOWER EXTREMITY, UNSPECIFIED CHRONICITY, UNSPECIFIED LATERALITY (HCC): Primary | ICD-10-CM

## 2023-03-27 LAB — INTERNATIONAL NORMALIZATION RATIO, POC: 2.4

## 2023-03-27 PROCEDURE — 85610 PROTHROMBIN TIME: CPT | Performed by: PHARMACIST

## 2023-03-27 PROCEDURE — 99211 OFF/OP EST MAY X REQ PHY/QHP: CPT | Performed by: PHARMACIST

## 2023-03-27 NOTE — PROGRESS NOTES
no change  7.5 7.5 5 7.5 5 7.5 5 45  5/24 2.7 At goal, no change  7.5 7.5 5 7.5 5 7.5 5 45  5/19 2.6 At goal, decrease  7.5 7.5 5 7.5 5 7.5 5 45  5/14 1.6 Below goal, bolus+increase 7.5 7.5 5 7.5 5 10/7.5 7.5 47.5   5/10 1.7 Below goal, increase  5 7.5 5 5 5 7.5 5 40   5/6 1.2 Below goal, continue  5 5 5 5 5 5 5 35    Last CBC:  Lab Results   Component Value Date    RBC 4.25 (L) 07/08/2017    HGB 13.7 07/08/2017    HCT 41.5 07/08/2017    MCV 97.7 07/08/2017    MCH 32.3 07/08/2017    MPV 8.3 07/08/2017    RDW 14.0 07/08/2017     07/08/2017       Patient History:  Recent hospitalizations/HC visits 10/11/21 PCP for suspected UTI, found to have kidney stone  1/28/21 [facility] ED for acute LLE DVT: diagnosed ~1 week after left foot surgery; was off anticoagulation for ~1-2 years at the time; started on Xarelto   Recent medication changes 8/2/22: Augmentin x 7 days for tooth infection  4/27/22: Augmentin for root canal  2/16/22: was on Z-surendra for COVID-19  1/19/22: started metformin prn weight loss    Medications taken regularly that may interact with warfarin or alter INR CoQ10, Fish Oil   Warfarin dose taken as prescribed Uses pillbox - reports some difficulty remembering PM meds, but takes warfarin in AM   Signs/symptoms of bleeding None reported  2/16/22: had hematoma on forearm a few weeks ago, has resolved    Vitamin K intake Normally has ~3 servings of green, leafy vegetables per week    Recent vomiting/diarrhea/fever, changes in weight or activity level None reported   Tobacco or alcohol use Patient denies smoking  Patient reports having 2 drinks per week on average. Drinks socially. Upcoming surgeries or procedures None      Assessment/Plan:  Patient's INR was therapeutic today (2.4). Patient denies any warfarin dosing errors, diet changes, or med changes since last visit. Patient was instructed to continue stable warfarin dose of 7.5 mg daily, except 5 mg on TuThSa. Repeat INR in 6 weeks.  Patient was

## 2023-05-08 ENCOUNTER — ANTI-COAG VISIT (OUTPATIENT)
Dept: PHARMACY | Age: 63
End: 2023-05-08
Payer: COMMERCIAL

## 2023-05-08 DIAGNOSIS — I82.4Y9 DEEP VEIN THROMBOSIS (DVT) OF PROXIMAL LOWER EXTREMITY, UNSPECIFIED CHRONICITY, UNSPECIFIED LATERALITY (HCC): Primary | ICD-10-CM

## 2023-05-08 LAB — INTERNATIONAL NORMALIZATION RATIO, POC: 2.2

## 2023-05-08 PROCEDURE — 85610 PROTHROMBIN TIME: CPT | Performed by: PHARMACIST

## 2023-05-08 PROCEDURE — 99211 OFF/OP EST MAY X REQ PHY/QHP: CPT | Performed by: PHARMACIST

## 2023-05-08 NOTE — PROGRESS NOTES
ANTICOAGULATION SERVICE    Lloyd Betts is a 59 y.o. male with PMHx significant for history of DVT (Feb 2011, Oct 2011, Dec 2012, Jan 2021 - surgically provoked), borderline protein S deficiency who presents to clinic 5/8/2023 for anticoagulation monitoring and adjustment. Patient has tried Xarelto, Eliquis, Pradaxa, and Savaysa in the past, but stopped due to GI distress.      Anticoagulation Indication(s):  DVT (acute, recurrent)  Referring Physician:  Dr. Alphonso Trejo  Goal INR Range:  2-3  Duration of Anticoagulation Therapy:  Indefinite   Time of day dose taken:  AM  Product patient has at home:  warfarin 5 mg (peach)    INR Summary                           Warfarin regimen (mg)  Date INR   A/P    Sun Mon Tue Wed Thu Fri Sat Mg/wk  5/8 2.2 At goal, no change  7.5 7.5 5 7.5 5 7.5 5 45  3/27 2.4 At goal, no change  7.5 7.5 5 7.5 5 7.5 5 45  2/13 2.1 At goal, no change  7.5 7.5 5 7.5 5 7.5 5 45  1/9 2.3 At goal, no change  7.5 7.5 5 7.5 5 7.5 5 45  11/21 2.3 At goal, no change  7.5 7.5 5 7.5 5 7.5 5 45  10/5 2.7 At goal, no change  7.5 7.5 5 7.5 5 7.5 5 45  9/7 2.8 At goal, no change  7.5 7.5 5 7.5 5 7.5 5 45  8/8 3.2 Above goal (ABX), continue 7.5 7.5 5 7.5 5 7.5 5 45  7/20 1.7 Below goal, bolus  7.5 7.5 5 10/7.5 5 7.5 5 45    6/8 2.1 At goal, no change  7.5 7.5 5 7.5 5 7.5 5 45   4/27 2.6 At goal, no change  7.5 7.5 5 7.5 5 7.5 5 45  3/16 2.2 At goal, no change  7.5 7.5 5 7.5 5 7.5 5 45   2/16 2.4 At goal, no change  7.5 7.5 5 7.5 5 7.5 5 45  1/19 2.8 At goal, no change  7.5 7.5 5 7.5 5 7.5 5 45  12/22 2.3 At goal, no change  7.5 7.5 5 7.5 5 7.5 5 45  11/24 2.6 At goal, no change  7.5 7.5 5 7.5 5 7.5 5 45  11/5 3.5 Above goal, hold x1  7.5 7.5 5 7.5 5 7.5 0/5 45  10/18 4.8 Above goal (Bactrim)  7.5 7.5 0/5 5/7.5 5 7.5 5 45  9/20 2.7 At goal, no change  7.5 7.5 5 7.5 5 7.5 5 45  8/23 2.8 At goal, no change  7.5 7.5 5 7.5 5 7.5 5 45  7/26     2.4  At goal, no change  7.5 7.5 5 7.5 5 7.5 5 45  6/28 2.6 At goal,

## 2023-06-19 ENCOUNTER — ANTI-COAG VISIT (OUTPATIENT)
Dept: PHARMACY | Age: 63
End: 2023-06-19
Payer: COMMERCIAL

## 2023-06-19 DIAGNOSIS — I82.4Y9 DEEP VEIN THROMBOSIS (DVT) OF PROXIMAL LOWER EXTREMITY, UNSPECIFIED CHRONICITY, UNSPECIFIED LATERALITY (HCC): Primary | ICD-10-CM

## 2023-06-19 LAB — INTERNATIONAL NORMALIZATION RATIO, POC: 3.7

## 2023-06-19 PROCEDURE — 99212 OFFICE O/P EST SF 10 MIN: CPT

## 2023-06-19 PROCEDURE — 85610 PROTHROMBIN TIME: CPT

## 2023-06-19 NOTE — PROGRESS NOTES
ANTICOAGULATION SERVICE    Sridevi Feldman is a 59 y.o. male with PMHx significant for history of DVT (Feb 2011, Oct 2011, Dec 2012, Jan 2021 - surgically provoked), borderline protein S deficiency who presents to clinic 6/19/2023 for anticoagulation monitoring and adjustment. Patient has tried Xarelto, Eliquis, Pradaxa, and Savaysa in the past, but stopped due to GI distress.      Anticoagulation Indication(s):  DVT (acute, recurrent)  Referring Physician:  Dr. Costa Salazar  Goal INR Range:  2-3  Duration of Anticoagulation Therapy:  Indefinite   Time of day dose taken:  AM  Product patient has at home:  warfarin 5 mg (peach)    INR Summary                           Warfarin regimen (mg)  Date INR   A/P    Sun Mon Tue Wed Thu Fri Sat Mg/wk  6/19 3.7 Above goal, hold  7.5 7.5 0/5 7.5 5 7.5 5 45  5/8 2.2 At goal, no change  7.5 7.5 5 7.5 5 7.5 5 45  3/27 2.4 At goal, no change  7.5 7.5 5 7.5 5 7.5 5 45  2/13 2.1 At goal, no change  7.5 7.5 5 7.5 5 7.5 5 45  1/9 2.3 At goal, no change  7.5 7.5 5 7.5 5 7.5 5 45  11/21 2.3 At goal, no change  7.5 7.5 5 7.5 5 7.5 5 45  10/5 2.7 At goal, no change  7.5 7.5 5 7.5 5 7.5 5 45  9/7 2.8 At goal, no change  7.5 7.5 5 7.5 5 7.5 5 45  8/8 3.2 Above goal (ABX), continue 7.5 7.5 5 7.5 5 7.5 5 45  7/20 1.7 Below goal, bolus  7.5 7.5 5 10/7.5 5 7.5 5 45    6/8 2.1 At goal, no change  7.5 7.5 5 7.5 5 7.5 5 45   4/27 2.6 At goal, no change  7.5 7.5 5 7.5 5 7.5 5 45  3/16 2.2 At goal, no change  7.5 7.5 5 7.5 5 7.5 5 45   2/16 2.4 At goal, no change  7.5 7.5 5 7.5 5 7.5 5 45  1/19 2.8 At goal, no change  7.5 7.5 5 7.5 5 7.5 5 45  12/22 2.3 At goal, no change  7.5 7.5 5 7.5 5 7.5 5 45  11/24 2.6 At goal, no change  7.5 7.5 5 7.5 5 7.5 5 45  11/5 3.5 Above goal, hold x1  7.5 7.5 5 7.5 5 7.5 0/5 45  10/18 4.8 Above goal (Bactrim)  7.5 7.5 0/5 5/7.5 5 7.5 5 45  9/20 2.7 At goal, no change  7.5 7.5 5 7.5 5 7.5 5 45  8/23 2.8 At goal, no change  7.5 7.5 5 7.5 5 7.5 5 45  7/26     2.4  At goal,

## 2023-07-13 ENCOUNTER — ANTI-COAG VISIT (OUTPATIENT)
Dept: PHARMACY | Age: 63
End: 2023-07-13
Payer: COMMERCIAL

## 2023-07-13 DIAGNOSIS — I82.4Y9 DEEP VEIN THROMBOSIS (DVT) OF PROXIMAL LOWER EXTREMITY, UNSPECIFIED CHRONICITY, UNSPECIFIED LATERALITY (HCC): Primary | ICD-10-CM

## 2023-07-13 LAB — INR BLD: 2.3

## 2023-07-13 PROCEDURE — 85610 PROTHROMBIN TIME: CPT

## 2023-07-13 PROCEDURE — 99211 OFF/OP EST MAY X REQ PHY/QHP: CPT

## 2023-07-13 NOTE — PROGRESS NOTES
ANTICOAGULATION SERVICE    Randi Aquino is a 59 y.o. male with PMHx significant for history of DVT (Feb 2011, Oct 2011, Dec 2012, Jan 2021 - surgically provoked), borderline protein S deficiency who presents to clinic 7/13/2023 for anticoagulation monitoring and adjustment. Patient has tried Xarelto, Eliquis, Pradaxa, and Savaysa in the past, but stopped due to GI distress.      Anticoagulation Indication(s):  DVT (acute, recurrent)  Referring Physician:  Dr. Madhavi Dean  Goal INR Range:  2-3  Duration of Anticoagulation Therapy:  Indefinite   Time of day dose taken:  AM  Product patient has at home:  warfarin 5 mg (peach)    INR Summary                           Warfarin regimen (mg)  Date INR   A/P    Sun Mon Tue Wed Thu Fri Sat Mg/wk  7/13 2.3 At goal, continue  7.5 7.5 5 7.5 5 7.5 5 45  6/19 3.7 Above goal, hold  7.5 7.5 0/5 7.5 5 7.5 5 45  5/8 2.2 At goal, no change  7.5 7.5 5 7.5 5 7.5 5 45  3/27 2.4 At goal, no change  7.5 7.5 5 7.5 5 7.5 5 45  2/13 2.1 At goal, no change  7.5 7.5 5 7.5 5 7.5 5 45  1/9 2.3 At goal, no change  7.5 7.5 5 7.5 5 7.5 5 45  11/21 2.3 At goal, no change  7.5 7.5 5 7.5 5 7.5 5 45  10/5 2.7 At goal, no change  7.5 7.5 5 7.5 5 7.5 5 45  9/7 2.8 At goal, no change  7.5 7.5 5 7.5 5 7.5 5 45  8/8 3.2 Above goal (ABX), continue 7.5 7.5 5 7.5 5 7.5 5 45  7/20 1.7 Below goal, bolus  7.5 7.5 5 10/7.5 5 7.5 5 45    6/8 2.1 At goal, no change  7.5 7.5 5 7.5 5 7.5 5 45   4/27 2.6 At goal, no change  7.5 7.5 5 7.5 5 7.5 5 45  3/16 2.2 At goal, no change  7.5 7.5 5 7.5 5 7.5 5 45   2/16 2.4 At goal, no change  7.5 7.5 5 7.5 5 7.5 5 45  1/19 2.8 At goal, no change  7.5 7.5 5 7.5 5 7.5 5 45  12/22 2.3 At goal, no change  7.5 7.5 5 7.5 5 7.5 5 45  11/24 2.6 At goal, no change  7.5 7.5 5 7.5 5 7.5 5 45  11/5 3.5 Above goal, hold x1  7.5 7.5 5 7.5 5 7.5 0/5 45  10/18 4.8 Above goal (Bactrim)  7.5 7.5 0/5 5/7.5 5 7.5 5 45  9/20 2.7 At goal, no change  7.5 7.5 5 7.5 5 7.5 5 45  8/23 2.8 At goal, no

## 2023-08-14 ENCOUNTER — ANTI-COAG VISIT (OUTPATIENT)
Dept: PHARMACY | Age: 63
End: 2023-08-14
Payer: COMMERCIAL

## 2023-08-14 DIAGNOSIS — I82.4Y9 DEEP VEIN THROMBOSIS (DVT) OF PROXIMAL LOWER EXTREMITY, UNSPECIFIED CHRONICITY, UNSPECIFIED LATERALITY (HCC): Primary | ICD-10-CM

## 2023-08-14 LAB — INTERNATIONAL NORMALIZATION RATIO, POC: 3.4

## 2023-08-14 PROCEDURE — 85610 PROTHROMBIN TIME: CPT | Performed by: PHARMACIST

## 2023-08-14 PROCEDURE — 99211 OFF/OP EST MAY X REQ PHY/QHP: CPT | Performed by: PHARMACIST

## 2023-08-14 NOTE — PROGRESS NOTES
likely due to decreased vitamin K intake over the last week. Patient denies any warfarin dosing errors, diet changes, or med changes since last visit. Patient was instructed to continue stable warfarin dose of 7.5 mg daily, except 5 mg on TuThSa. Repeat INR in 4 weeks (return to 6 weeks if stable). Patient was reminded to maintain consistent vitamin K intake and call with any bleeding, medication changes, or fever/vomiting/diarrhea. Patient understands dosing directions and information discussed. Dosing schedule and follow up appointment given to patient. Progress note routed to referring physician's office. Patient acknowledges working in consult agreement with pharmacist as referred by his/her physician. Next Clinic Appointment:  9/11    Please call [facility] at [phone] with any questions.         Annika Sabillon, PharmD, [facility]  [facility] Medication Management [place]: [phone]  [place]: [phone]  8/14/2023 5:02 PM    For Pharmacy Admin Tracking Only  Time Spent (min): 15

## 2023-09-07 ENCOUNTER — ANTI-COAG VISIT (OUTPATIENT)
Dept: PHARMACY | Age: 63
End: 2023-09-07
Payer: COMMERCIAL

## 2023-09-07 DIAGNOSIS — I82.4Y9 DEEP VEIN THROMBOSIS (DVT) OF PROXIMAL LOWER EXTREMITY, UNSPECIFIED CHRONICITY, UNSPECIFIED LATERALITY (HCC): Primary | ICD-10-CM

## 2023-09-07 LAB — INTERNATIONAL NORMALIZATION RATIO, POC: 1.4

## 2023-09-07 PROCEDURE — 99212 OFFICE O/P EST SF 10 MIN: CPT | Performed by: PHARMACIST

## 2023-09-07 PROCEDURE — 85610 PROTHROMBIN TIME: CPT | Performed by: PHARMACIST

## 2023-09-07 NOTE — PROGRESS NOTES
ANTICOAGULATION SERVICE    Abdullahi Glez is a 59 y.o. male with PMHx significant for history of DVT (Feb 2011, Oct 2011, Dec 2012, Jan 2021 - surgically provoked), borderline protein S deficiency who presents to clinic 9/7/2023 for anticoagulation monitoring and adjustment. Patient has tried Xarelto, Eliquis, Pradaxa, and Savaysa in the past, but stopped due to GI distress.      Anticoagulation Indication(s):  DVT (acute, recurrent)  Referring Physician:  Dr. Cassius Ibrahim  Goal INR Range:  2-3  Duration of Anticoagulation Therapy:  Indefinite   Time of day dose taken:  AM  Product patient has at home:  warfarin 5 mg (peach)    INR Summary                           Warfarin regimen (mg)  Date INR   A/P    Sun Mon Tue Wed Thu Fri Sat Mg/wk  9/7 1.4 Below goal, missed  7.5 7.5 5 7.5 10/5 7.5 5 45  8/14 3.4 Above goal, continue  7.5 7.5 5 7.5 5 7.5 5 45  7/13 2.3 At goal, continue  7.5 7.5 5 7.5 5 7.5 5 45  6/19 3.7 Above goal, hold  7.5 7.5 0/5 7.5 5 7.5 5 45  5/8 2.2 At goal, no change  7.5 7.5 5 7.5 5 7.5 5 45  3/27 2.4 At goal, no change  7.5 7.5 5 7.5 5 7.5 5 45  2/13 2.1 At goal, no change  7.5 7.5 5 7.5 5 7.5 5 45  1/9 2.3 At goal, no change  7.5 7.5 5 7.5 5 7.5 5 45  11/21 2.3 At goal, no change  7.5 7.5 5 7.5 5 7.5 5 45  10/5 2.7 At goal, no change  7.5 7.5 5 7.5 5 7.5 5 45  9/7 2.8 At goal, no change  7.5 7.5 5 7.5 5 7.5 5 45  8/8 3.2 Above goal (ABX), continue 7.5 7.5 5 7.5 5 7.5 5 45  7/20 1.7 Below goal, bolus  7.5 7.5 5 10/7.5 5 7.5 5 45    6/8 2.1 At goal, no change  7.5 7.5 5 7.5 5 7.5 5 45   4/27 2.6 At goal, no change  7.5 7.5 5 7.5 5 7.5 5 45  3/16 2.2 At goal, no change  7.5 7.5 5 7.5 5 7.5 5 45   2/16 2.4 At goal, no change  7.5 7.5 5 7.5 5 7.5 5 45  1/19 2.8 At goal, no change  7.5 7.5 5 7.5 5 7.5 5 45  12/22 2.3 At goal, no change  7.5 7.5 5 7.5 5 7.5 5 45  11/24 2.6 At goal, no change  7.5 7.5 5 7.5 5 7.5 5 45  11/5 3.5 Above goal, hold x1  7.5 7.5 5 7.5 5 7.5 0/5 45  10/18 4.8 Above goal

## 2023-09-11 ENCOUNTER — ANTI-COAG VISIT (OUTPATIENT)
Dept: PHARMACY | Age: 63
End: 2023-09-11
Payer: COMMERCIAL

## 2023-09-11 DIAGNOSIS — I82.4Y9 DEEP VEIN THROMBOSIS (DVT) OF PROXIMAL LOWER EXTREMITY, UNSPECIFIED CHRONICITY, UNSPECIFIED LATERALITY (HCC): Primary | ICD-10-CM

## 2023-09-11 LAB — INTERNATIONAL NORMALIZATION RATIO, POC: 2.2

## 2023-09-11 PROCEDURE — 85610 PROTHROMBIN TIME: CPT | Performed by: PHARMACIST

## 2023-09-11 PROCEDURE — 99211 OFF/OP EST MAY X REQ PHY/QHP: CPT | Performed by: PHARMACIST

## 2023-09-19 ENCOUNTER — HOSPITAL ENCOUNTER (OUTPATIENT)
Age: 63
Discharge: HOME | End: 2023-09-19
Payer: COMMERCIAL

## 2023-09-19 ENCOUNTER — HOSPITAL ENCOUNTER (OUTPATIENT)
Dept: GENERAL RADIOLOGY | Age: 63
Discharge: HOME | End: 2023-09-19
Attending: UROLOGY
Payer: COMMERCIAL

## 2023-09-19 ENCOUNTER — HOSPITAL ENCOUNTER (OUTPATIENT)
Age: 63
Setting detail: SPECIMEN
Discharge: HOME | End: 2023-09-19
Payer: COMMERCIAL

## 2023-09-19 LAB — PSA SERPL DL<=0.01 NG/ML-MCNC: 1.47 NG/ML (ref 0–4)

## 2023-09-19 PROCEDURE — 84153 ASSAY OF PSA TOTAL: CPT

## 2023-09-19 PROCEDURE — 74018 RADEX ABDOMEN 1 VIEW: CPT

## 2023-09-19 PROCEDURE — 36415 COLL VENOUS BLD VENIPUNCTURE: CPT

## 2023-10-09 ENCOUNTER — TELEPHONE (OUTPATIENT)
Dept: PHARMACY | Age: 63
End: 2023-10-09

## 2023-10-09 NOTE — TELEPHONE ENCOUNTER
Patient no showed to anticoagulation clinic today. Called patient and LVM to reschedule INR check ASAP.      Jameel Negro, PharmD, BCPS  [facility]  [place]: [phone]  [place]: [phone]  10/9/2023 5:10 PM

## 2023-10-10 NOTE — TELEPHONE ENCOUNTER
R/s 10/12.     Alka Garcia, PharmD, [facility]  Medication Management Clinic   [facility] Ph: [phone]  [facility] Ph: [phone]  10/10/2023 1:15 PM

## 2023-10-12 ENCOUNTER — ANTI-COAG VISIT (OUTPATIENT)
Dept: PHARMACY | Age: 63
End: 2023-10-12
Payer: COMMERCIAL

## 2023-10-12 DIAGNOSIS — I82.4Y9 DEEP VEIN THROMBOSIS (DVT) OF PROXIMAL LOWER EXTREMITY, UNSPECIFIED CHRONICITY, UNSPECIFIED LATERALITY (HCC): Primary | ICD-10-CM

## 2023-10-12 LAB — INTERNATIONAL NORMALIZATION RATIO, POC: 3.3

## 2023-10-12 PROCEDURE — 85610 PROTHROMBIN TIME: CPT | Performed by: PHARMACIST

## 2023-10-12 PROCEDURE — 99211 OFF/OP EST MAY X REQ PHY/QHP: CPT | Performed by: PHARMACIST

## 2023-10-12 NOTE — PROGRESS NOTES
ANTICOAGULATION SERVICE    Reuben Arias is a 59 y.o. male with PMHx significant for history of DVT (Feb 2011, Oct 2011, Dec 2012, Jan 2021 - surgically provoked), borderline protein S deficiency who presents to clinic 10/12/2023 for anticoagulation monitoring and adjustment. Patient has tried Xarelto, Eliquis, Pradaxa, and Savaysa in the past, but stopped due to GI distress.      Anticoagulation Indication(s):  DVT (acute, recurrent)  Referring Physician:  Dr. Roni Denny  Goal INR Range:  2-3  Duration of Anticoagulation Therapy:  Indefinite   Time of day dose taken:  AM  Product patient has at home:  warfarin 5 mg (peach)    INR Summary                           Warfarin regimen (mg)  Date INR   A/P    Sun Mon Tue Wed Thu Fri Sat Mg/wk  10/12 3.3 Above goal, continue  7.5 7.5 5 7.5 5 7.5 5 45  9/11 2.2 At goal, continue  7.5 7.5 5 7.5 5 7.5 5 45  9/7 1.4 Below goal, missed  7.5 7.5 5 7.5 10/5 7.5 5 45  8/14 3.4 Above goal, continue  7.5 7.5 5 7.5 5 7.5 5 45  7/13 2.3 At goal, continue  7.5 7.5 5 7.5 5 7.5 5 45  6/19 3.7 Above goal, hold  7.5 7.5 0/5 7.5 5 7.5 5 45  5/8 2.2 At goal, no change  7.5 7.5 5 7.5 5 7.5 5 45  3/27 2.4 At goal, no change  7.5 7.5 5 7.5 5 7.5 5 45  2/13 2.1 At goal, no change  7.5 7.5 5 7.5 5 7.5 5 45  1/9 2.3 At goal, no change  7.5 7.5 5 7.5 5 7.5 5 45  11/21 2.3 At goal, no change  7.5 7.5 5 7.5 5 7.5 5 45  10/5 2.7 At goal, no change  7.5 7.5 5 7.5 5 7.5 5 45  9/7 2.8 At goal, no change  7.5 7.5 5 7.5 5 7.5 5 45  8/8 3.2 Above goal (ABX), continue 7.5 7.5 5 7.5 5 7.5 5 45  7/20 1.7 Below goal, bolus  7.5 7.5 5 10/7.5 5 7.5 5 45   6/8 2.1 At goal, no change  7.5 7.5 5 7.5 5 7.5 5 45   4/27 2.6 At goal, no change  7.5 7.5 5 7.5 5 7.5 5 45  3/16 2.2 At goal, no change  7.5 7.5 5 7.5 5 7.5 5 45   2/16 2.4 At goal, no change  7.5 7.5 5 7.5 5 7.5 5 45  1/19 2.8 At goal, no change  7.5 7.5 5 7.5 5 7.5 5 45  12/22 2.3 At goal, no change  7.5 7.5 5 7.5 5 7.5 5 45  11/24 2.6 At goal, no

## 2023-11-07 ENCOUNTER — ANTI-COAG VISIT (OUTPATIENT)
Dept: PHARMACY | Age: 63
End: 2023-11-07
Payer: COMMERCIAL

## 2023-11-07 DIAGNOSIS — I82.4Y9 DEEP VEIN THROMBOSIS (DVT) OF PROXIMAL LOWER EXTREMITY, UNSPECIFIED CHRONICITY, UNSPECIFIED LATERALITY (HCC): Primary | ICD-10-CM

## 2023-11-07 LAB — INTERNATIONAL NORMALIZATION RATIO, POC: 2.7

## 2023-11-07 PROCEDURE — 85610 PROTHROMBIN TIME: CPT

## 2023-11-07 PROCEDURE — 99211 OFF/OP EST MAY X REQ PHY/QHP: CPT

## 2023-11-07 NOTE — PROGRESS NOTES
ANTICOAGULATION SERVICE    Jennifer Beck is a 59 y.o. male with PMHx significant for history of DVT (Feb 2011, Oct 2011, Dec 2012, Jan 2021 - surgically provoked), borderline protein S deficiency who presents to clinic 11/7/2023 for anticoagulation monitoring and adjustment. Patient has tried Xarelto, Eliquis, Pradaxa, and Savaysa in the past, but stopped due to GI distress.      Anticoagulation Indication(s):  DVT (acute, recurrent)  Referring Physician:  Dr. Dipti Morales  Goal INR Range:  2-3  Duration of Anticoagulation Therapy:  Indefinite   Time of day dose taken:  AM  Product patient has at home:  warfarin 5 mg (peach)    INR Summary                           Warfarin regimen (mg)  Date INR   A/P    Sun Mon Tue Wed Thu Fri Sat Mg/wk  11/7 2.7 At goal, continue  7.5 7.5 5 7.5 5 7.5 5 45  10/12 3.3 Above goal, continue  7.5 7.5 5 7.5 5 7.5 5 45  9/11 2.2 At goal, continue  7.5 7.5 5 7.5 5 7.5 5 45  9/7 1.4 Below goal, missed  7.5 7.5 5 7.5 10/5 7.5 5 45  8/14 3.4 Above goal, continue  7.5 7.5 5 7.5 5 7.5 5 45  7/13 2.3 At goal, continue  7.5 7.5 5 7.5 5 7.5 5 45  6/19 3.7 Above goal, hold  7.5 7.5 0/5 7.5 5 7.5 5 45  5/8 2.2 At goal, no change  7.5 7.5 5 7.5 5 7.5 5 45  3/27 2.4 At goal, no change  7.5 7.5 5 7.5 5 7.5 5 45  2/13 2.1 At goal, no change  7.5 7.5 5 7.5 5 7.5 5 45  1/9 2.3 At goal, no change  7.5 7.5 5 7.5 5 7.5 5 45  11/21 2.3 At goal, no change  7.5 7.5 5 7.5 5 7.5 5 45  10/5 2.7 At goal, no change  7.5 7.5 5 7.5 5 7.5 5 45  9/7 2.8 At goal, no change  7.5 7.5 5 7.5 5 7.5 5 45  8/8 3.2 Above goal (ABX), continue 7.5 7.5 5 7.5 5 7.5 5 45  7/20 1.7 Below goal, bolus  7.5 7.5 5 10/7.5 5 7.5 5 45   6/8 2.1 At goal, no change  7.5 7.5 5 7.5 5 7.5 5 45   4/27 2.6 At goal, no change  7.5 7.5 5 7.5 5 7.5 5 45  3/16 2.2 At goal, no change  7.5 7.5 5 7.5 5 7.5 5 45   2/16 2.4 At goal, no change  7.5 7.5 5 7.5 5 7.5 5 45  1/19 2.8 At goal, no change  7.5 7.5 5 7.5 5 7.5 5 45  12/22 2.3 At goal, no

## 2024-01-22 ENCOUNTER — ANTI-COAG VISIT (OUTPATIENT)
Dept: PHARMACY | Age: 64
End: 2024-01-22
Payer: COMMERCIAL

## 2024-01-22 DIAGNOSIS — I82.4Y9 DEEP VEIN THROMBOSIS (DVT) OF PROXIMAL LOWER EXTREMITY, UNSPECIFIED CHRONICITY, UNSPECIFIED LATERALITY (HCC): Primary | ICD-10-CM

## 2024-01-22 LAB — INTERNATIONAL NORMALIZATION RATIO, POC: 2.5

## 2024-01-22 PROCEDURE — 99211 OFF/OP EST MAY X REQ PHY/QHP: CPT | Performed by: PHARMACIST

## 2024-01-22 PROCEDURE — 85610 PROTHROMBIN TIME: CPT | Performed by: PHARMACIST

## 2024-01-22 NOTE — PROGRESS NOTES
ANTICOAGULATION SERVICE    Sunil Regan is a 63 y.o. male with PMHx significant for history of DVT (Feb 2011, Oct 2011, Dec 2012, Jan 2021 - surgically provoked), borderline protein S deficiency who presents to clinic 1/22/2024 for anticoagulation monitoring and adjustment.  Patient has tried Xarelto, Eliquis, Pradaxa, and Savaysa in the past, but stopped due to GI distress.     Anticoagulation Indication(s):  DVT (acute, recurrent)  Referring Physician:  Dr. Gisell Tucker  Goal INR Range:  2-3  Duration of Anticoagulation Therapy:  Indefinite   Time of day dose taken:  AM  Product patient has at home:  warfarin 5 mg (peach)    INR Summary                           Warfarin regimen (mg)  Date INR   A/P    Sun Mon Tue Wed Thu Fri Sat Mg/wk  1/22 2.5 At goal, continue  7.5 7.5 5 7.5 5 7.5 5 45  12/18 2.9 At goal, continue  7.5 7.5 5 7.5 5 7.5 5 45  11/7 2.7 At goal, continue  7.5 7.5 5 7.5 5 7.5 5 45  10/12 3.3 Above goal, continue  7.5 7.5 5 7.5 5 7.5 5 45  9/11 2.2 At goal, continue  7.5 7.5 5 7.5 5 7.5 5 45  9/7 1.4 Below goal, missed  7.5 7.5 5 7.5 10/5 7.5 5 45  8/14 3.4 Above goal, continue  7.5 7.5 5 7.5 5 7.5 5 45  7/13 2.3 At goal, continue  7.5 7.5 5 7.5 5 7.5 5 45  6/19 3.7 Above goal, hold  7.5 7.5 0/5 7.5 5 7.5 5 45  5/8 2.2 At goal, no change  7.5 7.5 5 7.5 5 7.5 5 45  3/27 2.4 At goal, no change  7.5 7.5 5 7.5 5 7.5 5 45  2/13 2.1 At goal, no change  7.5 7.5 5 7.5 5 7.5 5 45  1/9 2.3 At goal, no change  7.5 7.5 5 7.5 5 7.5 5 45  11/21 2.3 At goal, no change  7.5 7.5 5 7.5 5 7.5 5 45  10/5 2.7 At goal, no change  7.5 7.5 5 7.5 5 7.5 5 45  9/7 2.8 At goal, no change  7.5 7.5 5 7.5 5 7.5 5 45  8/8 3.2 Above goal (ABX), continue 7.5 7.5 5 7.5 5 7.5 5 45  7/20 1.7 Below goal, bolus  7.5 7.5 5 10/7.5 5 7.5 5 45   6/8 2.1 At goal, no change  7.5 7.5 5 7.5 5 7.5 5 45   4/27 2.6 At goal, no change  7.5 7.5 5 7.5 5 7.5 5 45  3/16 2.2 At goal, no change  7.5 7.5 5 7.5 5 7.5 5 45   2/16 2.4 At goal, no

## 2024-02-09 ENCOUNTER — TELEPHONE (OUTPATIENT)
Dept: PHARMACY | Age: 64
End: 2024-02-09

## 2024-02-09 DIAGNOSIS — I82.4Y9 DEEP VEIN THROMBOSIS (DVT) OF PROXIMAL LOWER EXTREMITY, UNSPECIFIED CHRONICITY, UNSPECIFIED LATERALITY (HCC): Primary | ICD-10-CM

## 2024-02-09 NOTE — TELEPHONE ENCOUNTER
Anticoag referral expiring soon. Faxed form to Dr. Tucker for review and signature.     Hortencia Johnson, PharmD, BCPS  [facility]  [place]: [phone]  [place]: [phone]  2/9/2024 3:39 PM

## 2024-03-04 ENCOUNTER — ANTI-COAG VISIT (OUTPATIENT)
Dept: PHARMACY | Age: 64
End: 2024-03-04
Payer: COMMERCIAL

## 2024-03-04 DIAGNOSIS — I82.4Y9 DEEP VEIN THROMBOSIS (DVT) OF PROXIMAL LOWER EXTREMITY, UNSPECIFIED CHRONICITY, UNSPECIFIED LATERALITY (HCC): Primary | ICD-10-CM

## 2024-03-04 LAB — INTERNATIONAL NORMALIZATION RATIO, POC: 2.2

## 2024-03-04 PROCEDURE — 99211 OFF/OP EST MAY X REQ PHY/QHP: CPT

## 2024-03-04 PROCEDURE — 85610 PROTHROMBIN TIME: CPT

## 2024-03-04 NOTE — PROGRESS NOTES
Vitamin K intake Normally has ~3 servings of green, leafy vegetables per week    Recent vomiting/diarrhea/fever, changes in weight or activity level None reported   Tobacco or alcohol use Patient denies smoking  Patient reports having 2 drinks per week on average. Drinks socially.    Upcoming surgeries or procedures None      Assessment/Plan:  Patient's INR was therapeutic today (2.2). Patient denies any other warfarin dosing errors, diet changes, or med changes since last visit.     Patient was instructed to continue stable warfarin dose of 7.5 mg daily, except 5 mg on TuThSa. Repeat INR in 6 weeks. Patient was reminded to maintain consistent vitamin K intake and call with any bleeding, medication changes, or fever/vomiting/diarrhea.    Patient understands dosing directions and information discussed. Dosing schedule and follow up appointment given to patient. Progress note routed to referring physician's office. Patient acknowledges working in consult agreement with pharmacist as referred by his/her physician.     Next Clinic Appointment: 4/15    Please call [facility] Anticoagulation [facility] at [phone] with any questions.      Thanks!  Carolina Donnelly, PharmD  [facility] Medication [facility]  [place]: [phone]  [place]: [phone]  3/4/2024 4:36 PM    For Pharmacy Admin Tracking Only    Time Spent (min): 15

## 2024-04-15 ENCOUNTER — ANTI-COAG VISIT (OUTPATIENT)
Dept: PHARMACY | Age: 64
End: 2024-04-15
Payer: COMMERCIAL

## 2024-04-15 DIAGNOSIS — I82.4Y9 DEEP VEIN THROMBOSIS (DVT) OF PROXIMAL LOWER EXTREMITY, UNSPECIFIED CHRONICITY, UNSPECIFIED LATERALITY (HCC): Primary | ICD-10-CM

## 2024-04-15 LAB — INTERNATIONAL NORMALIZATION RATIO, POC: 2.1

## 2024-04-15 PROCEDURE — 85610 PROTHROMBIN TIME: CPT

## 2024-04-15 PROCEDURE — 99211 OFF/OP EST MAY X REQ PHY/QHP: CPT

## 2024-04-15 NOTE — PROGRESS NOTES
change  7.5 7.5 5 7.5 5 7.5 5 45  3/16 2.2 At goal, no change  7.5 7.5 5 7.5 5 7.5 5 45   2/16 2.4 At goal, no change  7.5 7.5 5 7.5 5 7.5 5 45  1/19 2.8 At goal, no change  7.5 7.5 5 7.5 5 7.5 5 45  12/22 2.3 At goal, no change  7.5 7.5 5 7.5 5 7.5 5 45  11/24 2.6 At goal, no change  7.5 7.5 5 7.5 5 7.5 5 45  11/5 3.5 Above goal, hold x1  7.5 7.5 5 7.5 5 7.5 0/5 45  10/18 4.8 Above goal (Bactrim)  7.5 7.5 0/5 5/7.5 5 7.5 5 45  9/20 2.7 At goal, no change  7.5 7.5 5 7.5 5 7.5 5 45  8/23 2.8 At goal, no change  7.5 7.5 5 7.5 5 7.5 5 45  7/26     2.4  At goal, no change  7.5 7.5 5 7.5 5 7.5 5 45  6/28 2.6 At goal, no change  7.5 7.5 5 7.5 5 7.5 5 45  6/7 3.0 At goal, no change  7.5 7.5 5 7.5 5 7.5 5 45  5/24 2.7 At goal, no change  7.5 7.5 5 7.5 5 7.5 5 45  5/19 2.6 At goal, decrease  7.5 7.5 5 7.5 5 7.5 5 45  5/14 1.6 Below goal, bolus+increase 7.5 7.5 5 7.5 5 10/7.5 7.5 47.5   5/10 1.7 Below goal, increase  5 7.5 5 5 5 7.5 5 40   5/6 1.2 Below goal, continue  5 5 5 5 5 5 5 35    Last CBC:  Lab Results   Component Value Date    RBC 4.25 (L) 07/08/2017    HGB 13.7 07/08/2017    HCT 41.5 07/08/2017    MCV 97.7 07/08/2017    MCH 32.3 07/08/2017    MPV 8.3 07/08/2017    RDW 14.0 07/08/2017     07/08/2017       Patient History:  Recent hospitalizations/HC visits 10/11/21 PCP for suspected UTI, found to have kidney stone  1/28/21 [facility] ED for acute LLE DVT: diagnosed ~1 week after left foot surgery; was off anticoagulation for ~1-2 years at the time; started on Xarelto   Recent medication changes 8/2/22: Augmentin x 7 days for tooth infection  4/27/22: Augmentin for root canal  2/16/22: was on Z-surendra for COVID-19  1/19/22: started metformin prn weight loss    Medications taken regularly that may interact with warfarin or alter INR CoQ10, Fish Oil   Warfarin dose taken as prescribed Uses pillbox - reports some difficulty remembering PM meds, but takes warfarin in AM   Signs/symptoms of bleeding None reported  2/16/22:

## 2024-05-20 ENCOUNTER — ANTI-COAG VISIT (OUTPATIENT)
Dept: PHARMACY | Age: 64
End: 2024-05-20
Payer: COMMERCIAL

## 2024-05-20 DIAGNOSIS — I82.4Y9 DEEP VEIN THROMBOSIS (DVT) OF PROXIMAL LOWER EXTREMITY, UNSPECIFIED CHRONICITY, UNSPECIFIED LATERALITY (HCC): Primary | ICD-10-CM

## 2024-05-20 LAB — INTERNATIONAL NORMALIZATION RATIO, POC: 2.8

## 2024-05-20 PROCEDURE — 99211 OFF/OP EST MAY X REQ PHY/QHP: CPT

## 2024-05-20 PROCEDURE — 85610 PROTHROMBIN TIME: CPT

## 2024-05-20 NOTE — PROGRESS NOTES
change  7.5 7.5 5 7.5 5 7.5 5 45   4/27 2.6 At goal, no change  7.5 7.5 5 7.5 5 7.5 5 45  3/16 2.2 At goal, no change  7.5 7.5 5 7.5 5 7.5 5 45   2/16 2.4 At goal, no change  7.5 7.5 5 7.5 5 7.5 5 45  1/19 2.8 At goal, no change  7.5 7.5 5 7.5 5 7.5 5 45  12/22 2.3 At goal, no change  7.5 7.5 5 7.5 5 7.5 5 45  11/24 2.6 At goal, no change  7.5 7.5 5 7.5 5 7.5 5 45  11/5 3.5 Above goal, hold x1  7.5 7.5 5 7.5 5 7.5 0/5 45  10/18 4.8 Above goal (Bactrim)  7.5 7.5 0/5 5/7.5 5 7.5 5 45  9/20 2.7 At goal, no change  7.5 7.5 5 7.5 5 7.5 5 45  8/23 2.8 At goal, no change  7.5 7.5 5 7.5 5 7.5 5 45  7/26     2.4  At goal, no change  7.5 7.5 5 7.5 5 7.5 5 45  6/28 2.6 At goal, no change  7.5 7.5 5 7.5 5 7.5 5 45  6/7 3.0 At goal, no change  7.5 7.5 5 7.5 5 7.5 5 45  5/24 2.7 At goal, no change  7.5 7.5 5 7.5 5 7.5 5 45  5/19 2.6 At goal, decrease  7.5 7.5 5 7.5 5 7.5 5 45  5/14 1.6 Below goal, bolus+increase 7.5 7.5 5 7.5 5 10/7.5 7.5 47.5   5/10 1.7 Below goal, increase  5 7.5 5 5 5 7.5 5 40   5/6 1.2 Below goal, continue  5 5 5 5 5 5 5 35    Last CBC:  Lab Results   Component Value Date    RBC 4.25 (L) 07/08/2017    HGB 13.7 07/08/2017    HCT 41.5 07/08/2017    MCV 97.7 07/08/2017    MCH 32.3 07/08/2017    MPV 8.3 07/08/2017    RDW 14.0 07/08/2017     07/08/2017       Patient History:  Recent hospitalizations/HC visits 10/11/21 PCP for suspected UTI, found to have kidney stone  1/28/21 [facility] ED for acute LLE DVT: diagnosed ~1 week after left foot surgery; was off anticoagulation for ~1-2 years at the time; started on Xarelto   Recent medication changes 8/2/22: Augmentin x 7 days for tooth infection  4/27/22: Augmentin for root canal  2/16/22: was on Z-surendra for COVID-19  1/19/22: started metformin prn weight loss    Medications taken regularly that may interact with warfarin or alter INR CoQ10, Fish Oil   Warfarin dose taken as prescribed Uses pillbox - reports some difficulty remembering PM meds, but takes warfarin in

## 2024-06-24 ENCOUNTER — ANTI-COAG VISIT (OUTPATIENT)
Dept: PHARMACY | Age: 64
End: 2024-06-24
Payer: COMMERCIAL

## 2024-06-24 DIAGNOSIS — I82.4Y9 DEEP VEIN THROMBOSIS (DVT) OF PROXIMAL LOWER EXTREMITY, UNSPECIFIED CHRONICITY, UNSPECIFIED LATERALITY (HCC): Primary | ICD-10-CM

## 2024-06-24 LAB — INTERNATIONAL NORMALIZATION RATIO, POC: 3.2

## 2024-06-24 PROCEDURE — 85610 PROTHROMBIN TIME: CPT | Performed by: PHARMACIST

## 2024-06-24 PROCEDURE — 99211 OFF/OP EST MAY X REQ PHY/QHP: CPT | Performed by: PHARMACIST

## 2024-06-24 NOTE — PROGRESS NOTES
ANTICOAGULATION SERVICE    Sunil Regan is a 64 y.o. male with PMHx significant for history of DVT (Feb 2011, Oct 2011, Dec 2012, Jan 2021 - surgically provoked), borderline protein S deficiency who presents to clinic 6/24/2024 for anticoagulation monitoring and adjustment.  Patient has tried Xarelto, Eliquis, Pradaxa, and Savaysa in the past, but stopped due to GI distress.     Anticoagulation Indication(s):  DVT (acute, recurrent)  Referring Physician:  Dr. Gisell Tucker  Goal INR Range:  2-3  Duration of Anticoagulation Therapy:  Indefinite   Time of day dose taken:  AM  Product patient has at home:  warfarin 5 mg (peach)    INR Summary                           Warfarin regimen (mg)  Date INR   A/P    Sun Mon Tue Wed Thu Fri Sat Mg/wk  6/24 3.2 Above goal, continue  7.5 7.5 5 7.5 5 7.5 5 45  5/20 2.8 At goal, continue  7.5 7.5 5 7.5 5 7.5 5 45  4/15 2.1 At goal, continue  7.5 7.5 5 7.5 5 7.5 5 45  3/4 2.2 At goal, continue  7.5 7.5 5 7.5 5 7.5 5 45  1/22 2.5 At goal, continue  7.5 7.5 5 7.5 5 7.5 5 45  12/18 2.9 At goal, continue  7.5 7.5 5 7.5 5 7.5 5 45  11/7 2.7 At goal, continue  7.5 7.5 5 7.5 5 7.5 5 45  10/12 3.3 Above goal, continue  7.5 7.5 5 7.5 5 7.5 5 45  9/11 2.2 At goal, continue  7.5 7.5 5 7.5 5 7.5 5 45  9/7 1.4 Below goal, missed  7.5 7.5 5 7.5 10/5 7.5 5 45  8/14 3.4 Above goal, continue  7.5 7.5 5 7.5 5 7.5 5 45  7/13 2.3 At goal, continue  7.5 7.5 5 7.5 5 7.5 5 45  6/19 3.7 Above goal, hold  7.5 7.5 0/5 7.5 5 7.5 5 45  5/8 2.2 At goal, no change  7.5 7.5 5 7.5 5 7.5 5 45  3/27 2.4 At goal, no change  7.5 7.5 5 7.5 5 7.5 5 45  2/13 2.1 At goal, no change  7.5 7.5 5 7.5 5 7.5 5 45  1/9 2.3 At goal, no change  7.5 7.5 5 7.5 5 7.5 5 45  11/21 2.3 At goal, no change  7.5 7.5 5 7.5 5 7.5 5 45  10/5 2.7 At goal, no change  7.5 7.5 5 7.5 5 7.5 5 45  9/7 2.8 At goal, no change  7.5 7.5 5 7.5 5 7.5 5 45  8/8 3.2 Above goal (ABX), continue 7.5 7.5 5 7.5 5 7.5 5 45  7/20 1.7 Below goal,

## 2024-07-29 ENCOUNTER — TELEPHONE (OUTPATIENT)
Dept: PHARMACY | Age: 64
End: 2024-07-29

## 2024-07-29 NOTE — TELEPHONE ENCOUNTER
Patient reports he was off warfarin for 5 days for heart cath which he had on 7/25. He did not bridge with Lovenox. Patient called to r/s INR check for Friday.     Hortencia Johnson, PharmD, [facility]  [facility]  [place]: [phone]  [place]: [phone]  7/29/2024 1:52 PM

## 2024-08-02 ENCOUNTER — ANTI-COAG VISIT (OUTPATIENT)
Dept: PHARMACY | Age: 64
End: 2024-08-02
Payer: COMMERCIAL

## 2024-08-02 DIAGNOSIS — I82.4Y9 DEEP VEIN THROMBOSIS (DVT) OF PROXIMAL LOWER EXTREMITY, UNSPECIFIED CHRONICITY, UNSPECIFIED LATERALITY (HCC): Primary | ICD-10-CM

## 2024-08-02 LAB
INTERNATIONAL NORMALIZATION RATIO, POC: 2.1
PROTHROMBIN TIME, POC: NORMAL

## 2024-08-02 PROCEDURE — 99211 OFF/OP EST MAY X REQ PHY/QHP: CPT | Performed by: PHARMACIST

## 2024-08-02 PROCEDURE — 85610 PROTHROMBIN TIME: CPT | Performed by: PHARMACIST

## 2024-08-02 NOTE — PROGRESS NOTES
ANTICOAGULATION SERVICE    Sunil Regan is a 64 y.o. male with PMHx significant for history of DVT (Feb 2011, Oct 2011, Dec 2012, Jan 2021 - surgically provoked), borderline protein S deficiency who presents to clinic 8/2/2024 for anticoagulation monitoring and adjustment.  Patient has tried Xarelto, Eliquis, Pradaxa, and Savaysa in the past, but stopped due to GI distress.     Anticoagulation Indication(s):  DVT (acute, recurrent)  Referring Physician:  Dr. Gisell Tucker  Goal INR Range:  2-3  Duration of Anticoagulation Therapy:  Indefinite   Time of day dose taken:  AM  Product patient has at home:  warfarin 5 mg (peach)    INR Summary                           Warfarin regimen (mg)  Date INR   A/P    Sun Mon Tue Wed Thu Fri Sat Mg/wk  8/2 2.1 At goal, continue  7.5 7.5 5 7.5 5 7.5 5 45  6/24 3.2 Above goal, continue  7.5 7.5 5 7.5 5 7.5 5 45  5/20 2.8 At goal, continue  7.5 7.5 5 7.5 5 7.5 5 45  4/15 2.1 At goal, continue  7.5 7.5 5 7.5 5 7.5 5 45  3/4 2.2 At goal, continue  7.5 7.5 5 7.5 5 7.5 5 45  1/22 2.5 At goal, continue  7.5 7.5 5 7.5 5 7.5 5 45  12/18 2.9 At goal, continue  7.5 7.5 5 7.5 5 7.5 5 45  11/7 2.7 At goal, continue  7.5 7.5 5 7.5 5 7.5 5 45  10/12 3.3 Above goal, continue  7.5 7.5 5 7.5 5 7.5 5 45  9/11 2.2 At goal, continue  7.5 7.5 5 7.5 5 7.5 5 45  9/7 1.4 Below goal, missed  7.5 7.5 5 7.5 10/5 7.5 5 45  8/14 3.4 Above goal, continue  7.5 7.5 5 7.5 5 7.5 5 45  7/13 2.3 At goal, continue  7.5 7.5 5 7.5 5 7.5 5 45  6/19 3.7 Above goal, hold  7.5 7.5 0/5 7.5 5 7.5 5 45  5/8 2.2 At goal, no change  7.5 7.5 5 7.5 5 7.5 5 45  3/27 2.4 At goal, no change  7.5 7.5 5 7.5 5 7.5 5 45  2/13 2.1 At goal, no change  7.5 7.5 5 7.5 5 7.5 5 45  1/9 2.3 At goal, no change  7.5 7.5 5 7.5 5 7.5 5 45  11/21 2.3 At goal, no change  7.5 7.5 5 7.5 5 7.5 5 45  10/5 2.7 At goal, no change  7.5 7.5 5 7.5 5 7.5 5 45  9/7 2.8 At goal, no change  7.5 7.5 5 7.5 5 7.5 5 45  8/8 3.2 Above goal (ABX),

## 2024-09-12 ENCOUNTER — ANTI-COAG VISIT (OUTPATIENT)
Dept: PHARMACY | Age: 64
End: 2024-09-12
Payer: COMMERCIAL

## 2024-09-12 DIAGNOSIS — I82.4Y9 DEEP VEIN THROMBOSIS (DVT) OF PROXIMAL LOWER EXTREMITY, UNSPECIFIED CHRONICITY, UNSPECIFIED LATERALITY (HCC): Primary | ICD-10-CM

## 2024-09-12 LAB
INTERNATIONAL NORMALIZATION RATIO, POC: 2.2
PROTHROMBIN TIME, POC: 0

## 2024-09-12 PROCEDURE — 85610 PROTHROMBIN TIME: CPT

## 2024-09-12 PROCEDURE — 99211 OFF/OP EST MAY X REQ PHY/QHP: CPT

## 2024-09-17 ENCOUNTER — HOSPITAL ENCOUNTER (OUTPATIENT)
Age: 64
Discharge: HOME | End: 2024-09-17
Payer: COMMERCIAL

## 2024-09-17 ENCOUNTER — HOSPITAL ENCOUNTER (OUTPATIENT)
Dept: GENERAL RADIOLOGY | Age: 64
Discharge: HOME | End: 2024-09-17
Attending: UROLOGY
Payer: COMMERCIAL

## 2024-09-17 LAB — PSA SERPL DL<=0.01 NG/ML-MCNC: 2.02 NG/ML (ref 0–4)

## 2024-09-17 PROCEDURE — 84153 ASSAY OF PSA TOTAL: CPT

## 2024-09-17 PROCEDURE — 36415 COLL VENOUS BLD VENIPUNCTURE: CPT

## 2024-09-17 PROCEDURE — 74018 RADEX ABDOMEN 1 VIEW: CPT

## 2024-09-30 ENCOUNTER — TELEPHONE (OUTPATIENT)
Dept: PHARMACY | Age: 64
End: 2024-09-30

## 2024-09-30 NOTE — TELEPHONE ENCOUNTER
Patient is wanting to get a kidney stone removal procedure. He was asking who he needs to get clearance from. LVM with patient asking when procedure is scheduled, and with who, so I can reach out surgeon and Dr. Tucker to formulate fer-procedural plan.     Anticipate patient would require lovenox bridging given hx of recurrent DVTs.     Hortencia Johnson, PharmD, [facility]  [facility]  [place]: [phone]  [place]: [phone]  9/30/2024 11:31 AM

## 2024-10-01 NOTE — TELEPHONE ENCOUNTER
Pt returned call. Dr Tucker ([facility]) provided instructions and lovenox RX for upcoming procedure on 10/9/24 by Dr Jim Shoemaker urologist ph: [phone]    Last warfarin dose 10/3.   Start lovenox BID on 10/4 until 10/8 in the AM (no lovenox 10/8 in the PM)  Procedure on 10/9/24  Resume warfarin + lovenox per Dr Shoemaker's instruction post-op.     Lovenox prescribed by Dr Tucker and pt will  on Friday.  Reviewed SQ injection technique.   INR r/s for 10/14. Pt is out of town and unable to come in for INR check PTP.    Esperanza Natarajan, PharmD, BCACP  [facility]   [facility] [place] Ph: [phone]  [facility] [place] Ph: [phone]  10/1/2024 4:30 PM

## 2024-10-17 ENCOUNTER — ANTI-COAG VISIT (OUTPATIENT)
Dept: PHARMACY | Age: 64
End: 2024-10-17
Payer: COMMERCIAL

## 2024-10-17 DIAGNOSIS — I82.4Y9 DEEP VEIN THROMBOSIS (DVT) OF PROXIMAL LOWER EXTREMITY, UNSPECIFIED CHRONICITY, UNSPECIFIED LATERALITY (HCC): Primary | ICD-10-CM

## 2024-10-17 LAB
INTERNATIONAL NORMALIZATION RATIO, POC: 2.1
PROTHROMBIN TIME, POC: 0

## 2024-10-17 PROCEDURE — 99211 OFF/OP EST MAY X REQ PHY/QHP: CPT | Performed by: PHARMACIST

## 2024-10-17 PROCEDURE — 85610 PROTHROMBIN TIME: CPT | Performed by: PHARMACIST

## 2024-10-17 NOTE — PROGRESS NOTES
surgery; was off anticoagulation for ~1-2 years at the time; started on Xarelto   Recent medication changes 8/2/22: Augmentin x 7 days for tooth infection  4/27/22: Augmentin for root canal  2/16/22: was on Z-surendra for COVID-19  1/19/22: started metformin prn weight loss    Medications taken regularly that may interact with warfarin or alter INR CoQ10, Fish Oil   Warfarin dose taken as prescribed Uses pillbox - reports some difficulty remembering PM meds, but takes warfarin in AM   Signs/symptoms of bleeding None reported  2/16/22: had hematoma on forearm a few weeks ago, has resolved    Vitamin K intake Normally has ~3 servings of green, leafy vegetables per week    Recent vomiting/diarrhea/fever, changes in weight or activity level None reported   Tobacco or alcohol use Patient denies smoking  Patient reports having 2 drinks per week on average. Drinks socially.    Upcoming surgeries or procedures None      Assessment/Plan:  Patient's INR was therapeutic today. He has been back on warfarin for 8 days since kidney stone removal surgery. He was instructed to bridge with Lovenox, which he did prior to procedure but did not do afterwards due reports of pain and bruising. Patient denies any other missed/incorrect warfarin doses, diet and medication changes since last visit.     Patient asked about re-trying DOAC therapy again. He previously stopped all of them due to GI distress. Advised patient to let clinic know if this is something he wants to explore and I will look into cost.      Patient was instructed to continue stable warfarin dose per plan above. Repeat INR in 6 weeks.      Thanks!  Hortencia Johnson, PharmD, [facility]  [facility]  [place]: [phone]  [place]: [phone]  10/17/2024 5:41 PM

## 2024-11-20 ENCOUNTER — ANTI-COAG VISIT (OUTPATIENT)
Dept: PHARMACY | Age: 64
End: 2024-11-20
Payer: COMMERCIAL

## 2024-11-20 DIAGNOSIS — I82.4Y9 DEEP VEIN THROMBOSIS (DVT) OF PROXIMAL LOWER EXTREMITY, UNSPECIFIED CHRONICITY, UNSPECIFIED LATERALITY (HCC): Primary | ICD-10-CM

## 2024-11-20 LAB
INTERNATIONAL NORMALIZATION RATIO, POC: 2.5
PROTHROMBIN TIME, POC: 0

## 2024-11-20 PROCEDURE — 99211 OFF/OP EST MAY X REQ PHY/QHP: CPT

## 2024-11-20 PROCEDURE — 85610 PROTHROMBIN TIME: CPT

## 2024-11-20 NOTE — PROGRESS NOTES
change  7.5 7.5 5 7.5 5 7.5 5 45  10/5 2.7 At goal, no change  7.5 7.5 5 7.5 5 7.5 5 45  9/7 2.8 At goal, no change  7.5 7.5 5 7.5 5 7.5 5 45  8/8 3.2 Above goal (ABX), continue 7.5 7.5 5 7.5 5 7.5 5 45  7/20 1.7 Below goal, bolus  7.5 7.5 5 10/7.5 5 7.5 5 45   6/8 2.1 At goal, no change  7.5 7.5 5 7.5 5 7.5 5 45   4/27 2.6 At goal, no change  7.5 7.5 5 7.5 5 7.5 5 45  3/16 2.2 At goal, no change  7.5 7.5 5 7.5 5 7.5 5 45   2/16 2.4 At goal, no change  7.5 7.5 5 7.5 5 7.5 5 45  1/19 2.8 At goal, no change  7.5 7.5 5 7.5 5 7.5 5 45  12/22 2.3 At goal, no change  7.5 7.5 5 7.5 5 7.5 5 45  11/24 2.6 At goal, no change  7.5 7.5 5 7.5 5 7.5 5 45  11/5 3.5 Above goal, hold x1  7.5 7.5 5 7.5 5 7.5 0/5 45  10/18 4.8 Above goal (Bactrim)  7.5 7.5 0/5 5/7.5 5 7.5 5 45  9/20 2.7 At goal, no change  7.5 7.5 5 7.5 5 7.5 5 45  8/23 2.8 At goal, no change  7.5 7.5 5 7.5 5 7.5 5 45  7/26     2.4  At goal, no change  7.5 7.5 5 7.5 5 7.5 5 45  6/28 2.6 At goal, no change  7.5 7.5 5 7.5 5 7.5 5 45  6/7 3.0 At goal, no change  7.5 7.5 5 7.5 5 7.5 5 45  5/24 2.7 At goal, no change  7.5 7.5 5 7.5 5 7.5 5 45  5/19 2.6 At goal, decrease  7.5 7.5 5 7.5 5 7.5 5 45  5/14 1.6 Below goal, bolus+increase 7.5 7.5 5 7.5 5 10/7.5 7.5 47.5   5/10 1.7 Below goal, increase  5 7.5 5 5 5 7.5 5 40   5/6 1.2 Below goal, continue  5 5 5 5 5 5 5 35    Last CBC:  Lab Results   Component Value Date    RBC 4.25 (L) 07/08/2017    HGB 13.7 07/08/2017    HCT 41.5 07/08/2017    MCV 97.7 07/08/2017    MCH 32.3 07/08/2017    MPV 8.3 07/08/2017    RDW 14.0 07/08/2017     07/08/2017       Patient History:  Recent hospitalizations/HC visits 10/9/24: kidney stone removal procedure, held warfarin x5 days with lovenox bridge. Did not take lovenox after procedure due to pain / bruising   7/25/24: Heart cath, held warfarin x5 days without bridge (did not notify clinic of procedure beforehand)   10/11/21 PCP for suspected UTI, found to have kidney stone  1/28/21 [facility]

## 2024-12-13 ENCOUNTER — TELEPHONE (OUTPATIENT)
Dept: PHARMACY | Age: 64
End: 2024-12-13

## 2024-12-13 NOTE — PROGRESS NOTES
Re: Sunil Wildis    Dear Provider,    Your patient is enrolled in a [facility] for warfarin management, and it is my recommendation to discontinue aspirin therapy for Sunil's safety.     According to recent updates in guidelines for the use of aspirin for primary prevention of ASCVD, this patient has been identified as taking aspirin likely without an appropriate indication for therapy. The 2019 ACC/AHA and 2022 [place] Preventive Task Force Guidelines for Primary Prevention both recommend against the addition of low-dose aspirin ( mg/day) in patients at an increased risk of bleeding.1,2 Concurrent oral anticoagulation including warfarin can significantly increase a patient's risk of bleeding.     A recent study comparing bleeding rates in those on oral anticoagulation with aspirin compared to those on oral anticoagulation alone showed an approximate 140% increase in the odds of bleeding with the addition of aspirin.3     Next Steps:   Please respond to this communication indicating whether you are agreeable to aspirin discontinuation:  Yes, I am agreeable for this patient to discontinue aspirin   No, I am not agreeable for this patient to discontinue aspirin at this time, as I find it to still be indicated for the following reason:  ________________________________________________  Please fax completed form to: [phone] “Attn: Pam” or call [phone] with a response by 12/18 .  If agreeable, Medication Management Pharmacy Staff will contact the patient with educational materials advising the patient to discontinue use.     Thank you for your time and consideration. I look forward to your response. Please contact me with questions.    Pam Wang, PharmD  PGY1 Pharmacy Resident  [facility]  Direct: [phone]  Main Pharmacy: [phone]  Fax (Please Attn: Pam): [phone]  Email: [email or web address]    Please be advised the above

## 2024-12-30 NOTE — PROGRESS NOTES
Patient agreeable to discontinuing aspirin for primary prevention d/t increased risk of bleeding and guideline recommendations. Pt understands to continue warfarin as directed by clinic for anticoagulation. Aspirin has been removed from medication list.    Please call with any questions,  Pam Wang, PharmD  PGY1 Pharmacy Resident  [phone]  12/30/2024 2:46 PM

## 2025-01-08 ENCOUNTER — ANTI-COAG VISIT (OUTPATIENT)
Dept: PHARMACY | Age: 65
End: 2025-01-08
Payer: COMMERCIAL

## 2025-01-08 DIAGNOSIS — I82.4Y9 DEEP VEIN THROMBOSIS (DVT) OF PROXIMAL LOWER EXTREMITY, UNSPECIFIED CHRONICITY, UNSPECIFIED LATERALITY (HCC): Primary | ICD-10-CM

## 2025-01-08 LAB — INTERNATIONAL NORMALIZATION RATIO, POC: 2.7

## 2025-01-08 PROCEDURE — 85610 PROTHROMBIN TIME: CPT | Performed by: PHARMACIST

## 2025-01-08 PROCEDURE — 99211 OFF/OP EST MAY X REQ PHY/QHP: CPT | Performed by: PHARMACIST

## 2025-01-08 NOTE — PROGRESS NOTES
ANTICOAGULATION SERVICE    Sunil Regan is a 64 y.o. male with PMHx significant for history of DVT (Feb 2011, Oct 2011, Dec 2012, Jan 2021 - surgically provoked), borderline protein S deficiency who presents to clinic 1/8/2025 for anticoagulation monitoring and adjustment.  Patient has tried Xarelto, Eliquis, Pradaxa, and Savaysa in the past, but stopped due to GI distress.     Anticoagulation Indication(s):  DVT (acute, recurrent)  Referring Physician:  Dr. Gisell Tucker  Goal INR Range:  2-3  Duration of Anticoagulation Therapy:  Indefinite   Time of day dose taken:  AM  Product patient has at home:  warfarin 5 mg (peach)    INR Summary                           Warfarin regimen (mg)  Date INR   A/P    Sun Mon Tue Wed Thu Fri Sat Mg/wk  1/8 2.7 At goal, continue  7.5 7.5 5 7.5 5 7.5 5 45  11/20 2.5 At goal, continue  7.5 7.5 5 7.5 5 7.5 5 45  10/17 2.1 At goal, continue  7.5 7.5 5 7.5 5 7.5 5 45  9/12 2.2 At goal, continue  7.5 7.5 5 7.5 5 7.5 5 45  8/2 2.1 At goal, continue  7.5 7.5 5 7.5 5 7.5 5 45  6/24 3.2 Above goal, continue  7.5 7.5 5 7.5 5 7.5 5 45  5/20 2.8 At goal, continue  7.5 7.5 5 7.5 5 7.5 5 45  4/15 2.1 At goal, continue  7.5 7.5 5 7.5 5 7.5 5 45  3/4 2.2 At goal, continue  7.5 7.5 5 7.5 5 7.5 5 45  1/22 2.5 At goal, continue  7.5 7.5 5 7.5 5 7.5 5 45  12/18 2.9 At goal, continue  7.5 7.5 5 7.5 5 7.5 5 45  11/7 2.7 At goal, continue  7.5 7.5 5 7.5 5 7.5 5 45  10/12 3.3 Above goal, continue  7.5 7.5 5 7.5 5 7.5 5 45  9/11 2.2 At goal, continue  7.5 7.5 5 7.5 5 7.5 5 45  9/7 1.4 Below goal, missed  7.5 7.5 5 7.5 10/5 7.5 5 45  8/14 3.4 Above goal, continue  7.5 7.5 5 7.5 5 7.5 5 45  7/13 2.3 At goal, continue  7.5 7.5 5 7.5 5 7.5 5 45  6/19 3.7 Above goal, hold  7.5 7.5 0/5 7.5 5 7.5 5 45  5/8 2.2 At goal, no change  7.5 7.5 5 7.5 5 7.5 5 45  3/27 2.4 At goal, no change  7.5 7.5 5 7.5 5 7.5 5 45  2/13 2.1 At goal, no change  7.5 7.5 5 7.5 5 7.5 5 45  1/9 2.3 At goal, no

## 2025-01-15 ENCOUNTER — TELEPHONE (OUTPATIENT)
Dept: PHARMACY | Age: 65
End: 2025-01-15

## 2025-01-15 DIAGNOSIS — I82.4Y9 DEEP VEIN THROMBOSIS (DVT) OF PROXIMAL LOWER EXTREMITY, UNSPECIFIED CHRONICITY, UNSPECIFIED LATERALITY (HCC): Primary | ICD-10-CM

## 2025-01-15 NOTE — TELEPHONE ENCOUNTER
Faxed referral to Dr. Tucker as antico clinic is expiring soon.     Hortencia Johnson, PharmD, BCPS  [facility]  [place]: [phone]  [place]: [phone]  1/15/2025 11:19 AM    For Pharmacy Admin Tracking Only  Time Spent (min): 10

## 2025-01-16 NOTE — TELEPHONE ENCOUNTER
Referral received. Scanned into media tab.     Hortencia Johnson, PharmD, BCPS  [facility]  [place]: [phone]  [facility]: [phone]  1/16/2025 8:46 AM

## 2025-03-05 ENCOUNTER — ANTI-COAG VISIT (OUTPATIENT)
Dept: PHARMACY | Age: 65
End: 2025-03-05
Payer: COMMERCIAL

## 2025-03-05 DIAGNOSIS — I82.4Y9 DEEP VEIN THROMBOSIS (DVT) OF PROXIMAL LOWER EXTREMITY, UNSPECIFIED CHRONICITY, UNSPECIFIED LATERALITY (HCC): Primary | ICD-10-CM

## 2025-03-05 LAB
INTERNATIONAL NORMALIZATION RATIO, POC: 4.1
PROTHROMBIN TIME, POC: 0

## 2025-03-05 PROCEDURE — 85610 PROTHROMBIN TIME: CPT

## 2025-03-05 PROCEDURE — 99212 OFFICE O/P EST SF 10 MIN: CPT

## 2025-03-05 NOTE — PROGRESS NOTES
ANTICOAGULATION SERVICE    Sunil Regan is a 65 y.o. male with PMHx significant for history of DVT (Feb 2011, Oct 2011, Dec 2012, Jan 2021 - surgically provoked), borderline protein S deficiency who presents to clinic 3/5/2025 for anticoagulation monitoring and adjustment.  Patient has tried Xarelto, Eliquis, Pradaxa, and Savaysa in the past, but stopped due to GI distress.     Anticoagulation Indication(s):  DVT (acute, recurrent)  Referring Physician:  Dr. Gisell Tucker  Goal INR Range:  2-3  Duration of Anticoagulation Therapy:  Indefinite   Time of day dose taken:  AM  Product patient has at home:  warfarin 5 mg (peach)    INR Summary                           Warfarin regimen (mg)  Date INR   A/P    Sun Mon Tue Wed Thu Fri Sat Mg/wk  3/5 4.1 Above goal, holdx2/cont 7.5 7.5 5 7.5 0/5 0/7.5 5 45  1/8 2.7 At goal, continue  7.5 7.5 5 7.5 5 7.5 5 45  11/20 2.5 At goal, continue  7.5 7.5 5 7.5 5 7.5 5 45  10/17 2.1 At goal, continue  7.5 7.5 5 7.5 5 7.5 5 45  9/12 2.2 At goal, continue  7.5 7.5 5 7.5 5 7.5 5 45  8/2 2.1 At goal, continue  7.5 7.5 5 7.5 5 7.5 5 45  6/24 3.2 Above goal, continue  7.5 7.5 5 7.5 5 7.5 5 45  5/20 2.8 At goal, continue  7.5 7.5 5 7.5 5 7.5 5 45  4/15 2.1 At goal, continue  7.5 7.5 5 7.5 5 7.5 5 45  3/4 2.2 At goal, continue  7.5 7.5 5 7.5 5 7.5 5 45  1/22 2.5 At goal, continue  7.5 7.5 5 7.5 5 7.5 5 45  12/18 2.9 At goal, continue  7.5 7.5 5 7.5 5 7.5 5 45  11/7 2.7 At goal, continue  7.5 7.5 5 7.5 5 7.5 5 45  10/12 3.3 Above goal, continue  7.5 7.5 5 7.5 5 7.5 5 45  9/11 2.2 At goal, continue  7.5 7.5 5 7.5 5 7.5 5 45  9/7 1.4 Below goal, missed  7.5 7.5 5 7.5 10/5 7.5 5 45  8/14 3.4 Above goal, continue  7.5 7.5 5 7.5 5 7.5 5 45  7/13 2.3 At goal, continue  7.5 7.5 5 7.5 5 7.5 5 45  6/19 3.7 Above goal, hold  7.5 7.5 0/5 7.5 5 7.5 5 45  5/8 2.2 At goal, no change  7.5 7.5 5 7.5 5 7.5 5 45  3/27 2.4 At goal, no change  7.5 7.5 5 7.5 5 7.5 5 45  2/13 2.1 At goal, no

## 2025-03-14 ENCOUNTER — TELEPHONE (OUTPATIENT)
Dept: PHARMACY | Age: 65
End: 2025-03-14

## 2025-03-14 NOTE — TELEPHONE ENCOUNTER
Patient called back and said that protime was 23.6 and INR was 1.9. Education provided. Patient due for higher warfarin dose (7.5mg vs 5mg) tonight so instructed patient to continue with current regimen as patient has been in INR goal range with this dose for a long time. Patient verbalized understanding of plan and had no further questions. Patient wants to keep current scheduled INR recheck appointment rather than scheduling an earlier date.     Chava Snyder, PharmD  PGY1 Pharmacy Resident   [place]: [phone]  03/14/25

## 2025-03-14 NOTE — TELEPHONE ENCOUNTER
Patient called and left voicemail regarding \"potentially abnormal lab results\". Looking at the chart, no recent lab results noted aside from the INR that was obtained during our visit on 3/5/25. Called and left voicemail to clarify.    Chava Snyder, PharmD  PGY1 Pharmacy Resident   [place]: [phone]  03/14/25

## 2025-04-11 ENCOUNTER — ANTI-COAG VISIT (OUTPATIENT)
Dept: PHARMACY | Age: 65
End: 2025-04-11
Payer: COMMERCIAL

## 2025-04-11 DIAGNOSIS — I82.4Y9 DEEP VEIN THROMBOSIS (DVT) OF PROXIMAL LOWER EXTREMITY, UNSPECIFIED CHRONICITY, UNSPECIFIED LATERALITY: Primary | ICD-10-CM

## 2025-04-11 LAB
INTERNATIONAL NORMALIZATION RATIO, POC: 2.7
PROTHROMBIN TIME, POC: 0

## 2025-04-11 PROCEDURE — 85610 PROTHROMBIN TIME: CPT | Performed by: PHARMACIST

## 2025-04-11 PROCEDURE — 99211 OFF/OP EST MAY X REQ PHY/QHP: CPT | Performed by: PHARMACIST

## 2025-04-11 NOTE — PROGRESS NOTES
ANTICOAGULATION SERVICE    Sunil Regan is a 65 y.o. male with PMHx significant for history of DVT (Feb 2011, Oct 2011, Dec 2012, Jan 2021 - surgically provoked), borderline protein S deficiency who presents to clinic 4/11/2025 for anticoagulation monitoring and adjustment.  Patient has tried Xarelto, Eliquis, Pradaxa, and Savaysa in the past, but stopped due to GI distress.     Anticoagulation Indication(s):  DVT (acute, recurrent)  Referring Physician:  Dr. Gisell Tucker  Goal INR Range:  2-3  Duration of Anticoagulation Therapy:  Indefinite   Time of day dose taken:  AM  Product patient has at home:  warfarin 5 mg (peach)    INR Summary                           Warfarin regimen (mg)  Date INR   A/P    Sun Mon Tue Wed Thu Fri Sat Mg/wk  4/11 2.7 At goal, continue   7.5 7.5 5 7.5 5 7.5 5 45  3/5 4.1 Above goal, holdx2/cont 7.5 7.5 5 7.5 0/5 0/7.5 5 45  1/8 2.7 At goal, continue  7.5 7.5 5 7.5 5 7.5 5 45  11/20 2.5 At goal, continue  7.5 7.5 5 7.5 5 7.5 5 45  10/17 2.1 At goal, continue  7.5 7.5 5 7.5 5 7.5 5 45  9/12 2.2 At goal, continue  7.5 7.5 5 7.5 5 7.5 5 45  8/2 2.1 At goal, continue  7.5 7.5 5 7.5 5 7.5 5 45  6/24 3.2 Above goal, continue  7.5 7.5 5 7.5 5 7.5 5 45  5/20 2.8 At goal, continue  7.5 7.5 5 7.5 5 7.5 5 45  4/15 2.1 At goal, continue  7.5 7.5 5 7.5 5 7.5 5 45  3/4 2.2 At goal, continue  7.5 7.5 5 7.5 5 7.5 5 45  1/22 2.5 At goal, continue  7.5 7.5 5 7.5 5 7.5 5 45  12/18 2.9 At goal, continue  7.5 7.5 5 7.5 5 7.5 5 45  11/7 2.7 At goal, continue  7.5 7.5 5 7.5 5 7.5 5 45  10/12 3.3 Above goal, continue  7.5 7.5 5 7.5 5 7.5 5 45  9/11 2.2 At goal, continue  7.5 7.5 5 7.5 5 7.5 5 45  9/7 1.4 Below goal, missed  7.5 7.5 5 7.5 10/5 7.5 5 45  8/14 3.4 Above goal, continue  7.5 7.5 5 7.5 5 7.5 5 45  7/13 2.3 At goal, continue  7.5 7.5 5 7.5 5 7.5 5 45  6/19 3.7 Above goal, hold  7.5 7.5 0/5 7.5 5 7.5 5 45  5/8 2.2 At goal, no change  7.5 7.5 5 7.5 5 7.5 5 45  3/27 2.4 At goal, no

## 2025-05-21 ENCOUNTER — ANTI-COAG VISIT (OUTPATIENT)
Dept: PHARMACY | Age: 65
End: 2025-05-21
Payer: COMMERCIAL

## 2025-05-21 DIAGNOSIS — I82.4Y9 DEEP VEIN THROMBOSIS (DVT) OF PROXIMAL LOWER EXTREMITY, UNSPECIFIED CHRONICITY, UNSPECIFIED LATERALITY (HCC): Primary | ICD-10-CM

## 2025-05-21 LAB
INTERNATIONAL NORMALIZATION RATIO, POC: 2.6
PROTHROMBIN TIME, POC: 0

## 2025-05-21 PROCEDURE — 85610 PROTHROMBIN TIME: CPT

## 2025-05-21 PROCEDURE — 99211 OFF/OP EST MAY X REQ PHY/QHP: CPT

## 2025-05-21 NOTE — PROGRESS NOTES
ANTICOAGULATION SERVICE    Sunil Regan is a 65 y.o. male with PMHx significant for history of DVT (Feb 2011, Oct 2011, Dec 2012, Jan 2021 - surgically provoked), borderline protein S deficiency who presents to clinic 5/21/2025 for anticoagulation monitoring and adjustment.  Patient has tried Xarelto, Eliquis, Pradaxa, and Savaysa in the past, but stopped due to GI distress.     Anticoagulation Indication(s):  DVT (acute, recurrent)  Referring Physician:  Dr. Gisell Tucker  Goal INR Range:  2-3  Duration of Anticoagulation Therapy:  Indefinite   Time of day dose taken:  AM  Product patient has at home:  warfarin 5 mg (peach)    INR Summary                           Warfarin regimen (mg)  Date INR   A/P    Sun Mon Tue Wed Thu Fri Sat Mg/wk  5/21 2.6 At goal, continue   7.5 7.5 5 7.5 5 7.5 5 45  4/11 2.7 At goal, continue   7.5 7.5 5 7.5 5 7.5 5 45  3/5 4.1 Above goal, holdx2/cont 7.5 7.5 5 7.5 0/5 0/7.5 5 45  1/8 2.7 At goal, continue  7.5 7.5 5 7.5 5 7.5 5 45  11/20 2.5 At goal, continue  7.5 7.5 5 7.5 5 7.5 5 45  10/17 2.1 At goal, continue  7.5 7.5 5 7.5 5 7.5 5 45  9/12 2.2 At goal, continue  7.5 7.5 5 7.5 5 7.5 5 45  8/2 2.1 At goal, continue  7.5 7.5 5 7.5 5 7.5 5 45  6/24 3.2 Above goal, continue  7.5 7.5 5 7.5 5 7.5 5 45  5/20 2.8 At goal, continue  7.5 7.5 5 7.5 5 7.5 5 45  4/15 2.1 At goal, continue  7.5 7.5 5 7.5 5 7.5 5 45  3/4 2.2 At goal, continue  7.5 7.5 5 7.5 5 7.5 5 45  1/22 2.5 At goal, continue  7.5 7.5 5 7.5 5 7.5 5 45  12/18 2.9 At goal, continue  7.5 7.5 5 7.5 5 7.5 5 45  11/7 2.7 At goal, continue  7.5 7.5 5 7.5 5 7.5 5 45  10/12 3.3 Above goal, continue  7.5 7.5 5 7.5 5 7.5 5 45  9/11 2.2 At goal, continue  7.5 7.5 5 7.5 5 7.5 5 45  9/7 1.4 Below goal, missed  7.5 7.5 5 7.5 10/5 7.5 5 45  8/14 3.4 Above goal, continue  7.5 7.5 5 7.5 5 7.5 5 45  7/13 2.3 At goal, continue  7.5 7.5 5 7.5 5 7.5 5 45  6/19 3.7 Above goal, hold  7.5 7.5 0/5 7.5 5 7.5 5 45  5/8 2.2 At goal, no

## 2025-07-08 ENCOUNTER — ANTI-COAG VISIT (OUTPATIENT)
Dept: PHARMACY | Age: 65
End: 2025-07-08
Payer: COMMERCIAL

## 2025-07-08 DIAGNOSIS — I82.4Y9 DEEP VEIN THROMBOSIS (DVT) OF PROXIMAL LOWER EXTREMITY, UNSPECIFIED CHRONICITY, UNSPECIFIED LATERALITY (HCC): Primary | ICD-10-CM

## 2025-07-08 LAB
INTERNATIONAL NORMALIZATION RATIO, POC: 2.6
PROTHROMBIN TIME, POC: 0

## 2025-07-08 PROCEDURE — 85610 PROTHROMBIN TIME: CPT

## 2025-07-08 PROCEDURE — 99211 OFF/OP EST MAY X REQ PHY/QHP: CPT

## 2025-07-08 NOTE — PROGRESS NOTES
warfarin x5 days with lovenox bridge. Did not take lovenox after procedure due to pain / bruising   7/25/24: Heart cath, held warfarin x5 days without bridge (did not notify clinic of procedure beforehand)   10/11/21 PCP for suspected UTI, found to have kidney stone  1/28/21 [facility] ED for acute LLE DVT: diagnosed ~1 week after left foot surgery; was off anticoagulation for ~1-2 years at the time; started on Xarelto   Recent medication changes 8/2/22: Augmentin x 7 days for tooth infection  4/27/22: Augmentin for root canal  2/16/22: was on Z-surendra for COVID-19  1/19/22: started metformin prn weight loss    Medications taken regularly that may interact with warfarin or alter INR CoQ10, Fish Oil   Warfarin dose taken as prescribed Uses pillbox - reports some difficulty remembering PM meds, but takes warfarin in AM   Signs/symptoms of bleeding None reported  2/16/22: had hematoma on forearm a few weeks ago, has resolved    Vitamin K intake Normally has ~3 servings of green, leafy vegetables per week    Recent vomiting/diarrhea/fever, changes in weight or activity level None reported   Tobacco or alcohol use Patient denies smoking  Patient reports having 2 drinks per week on average. Drinks socially.    Upcoming surgeries or procedures None      Assessment/Plan:  Patient's INR was therapeutic today. Patient denied any changes, bruising/ bleeding, missed doses.     Instructed patient to continue current warfarin regimen as above. Will repeat INR in 6 weeks. Patient prefers to extend interval between appts.     Caleb Evans PharmD  [facility]  Office: [phone]  Fax: [phone]  7/8/2025 4:42 PM      For Pharmacy Admin Tracking Only  Time Spent (min): 15

## 2025-08-19 ENCOUNTER — TELEPHONE (OUTPATIENT)
Dept: PHARMACY | Age: 65
End: 2025-08-19

## 2025-09-05 ENCOUNTER — ANTI-COAG VISIT (OUTPATIENT)
Dept: PHARMACY | Age: 65
End: 2025-09-05
Payer: COMMERCIAL

## 2025-09-05 DIAGNOSIS — I82.4Y9 DEEP VEIN THROMBOSIS (DVT) OF PROXIMAL LOWER EXTREMITY, UNSPECIFIED CHRONICITY, UNSPECIFIED LATERALITY (HCC): Primary | ICD-10-CM

## 2025-09-05 LAB
INTERNATIONAL NORMALIZATION RATIO, POC: 2.9
PROTHROMBIN TIME, POC: NORMAL

## 2025-09-05 PROCEDURE — 99211 OFF/OP EST MAY X REQ PHY/QHP: CPT | Performed by: PHARMACIST

## 2025-09-05 PROCEDURE — 85610 PROTHROMBIN TIME: CPT | Performed by: PHARMACIST

## (undated) DEVICE — 3M™ STERI-STRIP™ COMPOUND BENZOIN TINCTURE 40 BAGS/CARTON 4 CARTONS/CASE C1544: Brand: 3M™ STERI-STRIP™

## (undated) DEVICE — GLOVE ORANGE PI 7 1/2   MSG9075

## (undated) DEVICE — FIBER LASER HOLM DISP SU200RT] LEONI FIBER OPTICS INC]

## (undated) DEVICE — PACK PROCEDURE SURG CYSTO SVMMC LF

## (undated) DEVICE — GLOVE ORANGE PI 7   MSG9070

## (undated) DEVICE — SINGLE ACTION PUMPING SYSTEM

## (undated) DEVICE — GARMENT COMPR STD FOR 17IN CALF UNIF THER FLOTRN

## (undated) DEVICE — GUIDEWIRE URO L150CM DIA0.035IN STIFF NIT HYDRPHLC STR TIP

## (undated) DEVICE — ADAPTER URO SCP UROLOK LL

## (undated) DEVICE — 3M™ STERI-STRIP™ REINFORCED ADHESIVE SKIN CLOSURES, R1547, 1/2 IN X 4 IN (12 MM X 100 MM), 6 STRIPS/ENVELOPE: Brand: 3M™ STERI-STRIP™

## (undated) DEVICE — DUAL LUMEN URETERAL CATHETER